# Patient Record
Sex: FEMALE | Race: BLACK OR AFRICAN AMERICAN | Employment: OTHER | URBAN - METROPOLITAN AREA
[De-identification: names, ages, dates, MRNs, and addresses within clinical notes are randomized per-mention and may not be internally consistent; named-entity substitution may affect disease eponyms.]

---

## 2017-08-08 ENCOUNTER — HOSPITAL ENCOUNTER (OUTPATIENT)
Dept: MAMMOGRAPHY | Age: 67
Discharge: HOME | End: 2017-08-08
Attending: FAMILY MEDICINE
Payer: MEDICARE

## 2017-08-08 DIAGNOSIS — Z12.31 VISIT FOR SCREENING MAMMOGRAM: ICD-10-CM

## 2017-08-08 PROCEDURE — 77063 BREAST TOMOSYNTHESIS BI: CPT

## 2018-05-18 ENCOUNTER — HOSPITAL ENCOUNTER (OUTPATIENT)
Dept: PHYSICAL THERAPY | Age: 68
Discharge: HOME | End: 2018-05-18
Payer: MEDICARE

## 2018-05-18 PROCEDURE — G8979 MOBILITY GOAL STATUS: HCPCS

## 2018-05-18 PROCEDURE — 97162 PT EVAL MOD COMPLEX 30 MIN: CPT

## 2018-05-18 PROCEDURE — 97110 THERAPEUTIC EXERCISES: CPT

## 2018-05-18 PROCEDURE — 97112 NEUROMUSCULAR REEDUCATION: CPT

## 2018-05-18 PROCEDURE — G8978 MOBILITY CURRENT STATUS: HCPCS

## 2018-05-18 NOTE — PROGRESS NOTES
[facility]  [address]  [place], [address]  [phone] fax    Plan of Care/ Statement of Necessity for Physical Therapy Services    Patient name: Rusty Small Start of Care: 2018   Referral source: Juarez Epperson, * : 1950    Medical Diagnosis: Unsteadiness on feet [R26.81]   Onset Date:3-4 weeks ago    Treatment Diagnosis: Gait abnormality   Prior Hospitalization: see medical history Provider#: [id number]   Medications: Verified on Patient summary List    Comorbidities: HTN, arthritis, Thyroid problems, breast reduction, BMI > 30   Prior Level of Function: The patient states that she was able to ambulate without SPC and seemed more steady. The Plan of Care and following information is based on the information from the initial evaluation. Assessment/ key information: The patient is a 75year old female with a chief complaint of unsteadiness that started about 3-4 weeks ago. She reports she was walking in the mall and after finishing a few laps, felt unsteady like she was veering to the side. She denies headaches at that time, sensations of the room spinning, or dizziness. She also denies paresthesias and weakness at any time. The patient states that the patient had difficulty performing tandem gait. She is scheduled to have a CT scan on 2018 of her brain. She presents with impairments consisting of decreased balance, limited stability, decreased ADL efficiency, and limited quality of life. The patient will benefit from skilled PT in order to address the aforementioned impairments.     Evaluation Complexity History MEDIUM  Complexity : 1-2 comorbidities / personal factors will impact the outcome/ POC ; Examination MEDIUM Complexity : 3 Standardized tests and measures addressing body structure, function, activity limitation and / or participation in recreation  ;Presentation MEDIUM Complexity : Evolving with changing characteristics  ; Clinical Decision Making MEDIUM Complexity : FOTO score of 26-74  Overall Complexity Rating: MEDIUM  Problem List: pain affecting function, decrease ROM, decrease strength, impaired gait/ balance, decrease ADL/ functional abilitiies and decrease activity tolerance   Treatment Plan may include any combination of the following: Therapeutic exercise, Therapeutic activities, Neuromuscular re-education, Physical agent/modality, Gait/balance training, Manual therapy and Patient education  Patient / Family readiness to learn indicated by: asking questions, trying to perform skills and interest  Persons(s) to be included in education: patient (P)  Barriers to Learning/Limitations: None  Patient Goal (s): well get my balance back to normal  Patient Self Reported Health Status: fair  Rehabilitation Potential: good    Short Term Goals: To be accomplished in 2 weeks:   1. The patient will be independent and compliant with HEP to maximize therapeutic benefit. 2. The patient will improve modified tandem on airex to 30\" without LOB to improve ambulation ease. Long Term Goals: To be accomplished in 4 weeks:   1. The patient will improve FOTO score to 66 to maximize quality of life. 2. The patient will improve DGI score to 16/24 to reduce falls risk. 3. The patient will perform tandem stance 5 back to back steps void of imbalance to reduce falls risk. 4. The patient will resume PLOF with mall walking void of AD to maximize ambulation efficiency and engage in exercise. Frequency / Duration: Patient to be seen 2 times per week for 4 weeks. Patient/ Caregiver education and instruction: Diagnosis, prognosis, self care, activity modification and exercises   [x]  Plan of care has been reviewed with PTA    G-Codes (GP)  Mobility   Current  CK= 40-59%   Goal  CJ= 20-39%    The severity rating is based on clinical judgment and the FOTO score.     Certification Period: 5/18/2018 - 7/18/2018  Gaganpito Ponds, PT 5/18/2018 3:52 PM    ________________________________________________________________________    I certify that the above Therapy Services are being furnished while the patient is under my care. I agree with the treatment plan and certify that this therapy is necessary.     [de-identified] Signature:____________________  Date:____________Time: _________    Please sign and return to [facility] [address] [address] [facility] [address] [place], [place] [address], [address]  [phone] fax

## 2018-05-18 NOTE — PROGRESS NOTES
PT DAILY TREATMENT NOTE - [facility]     Patient Name: Ofelia Marking  Date:2018  : 1950  [x]  Patient  Verified  Payor: Ivan Heard / Plan: [place] MED[place] / Product Type: Medicare /    In time:2:09  Out time:2:55  Total Treatment Time (min): 46  Total Timed Codes (min): 25  1:1 Treatment Time ([address] only): 55   Visit #: 1 of 8    Treatment Area: Unsteadiness on feet [R26.81]    SUBJECTIVE  Pain Level (0-10 scale): 0/10  Any medication changes, allergies to medications, adverse drug reactions, diagnosis change, or new procedure performed?: [x] No    [] Yes (see summary sheet for update)  Subjective functional status/changes:   [] No changes reported  The patient states that she has a chief complaint of unsteadiness with gait. OBJECTIVE  36 min [x]Eval                  []Re-Eval       10 min Therapeutic Exercise:  [x] See flow sheet :   Rationale: increase ROM and increase strength to improve the patients ability to improve ADL ease. 15 min Neuromuscular Re-education:  [x]  See flow sheet :   Rationale: improve coordination, improve balance and increase proprioception  to improve the patients ability to improve ADL ease. With   [] TE   [] TA   [] neuro   [] other: Patient Education: [x] Review HEP    [] Progressed/Changed HEP based on:   [] positioning   [] body mechanics   [] transfers   [] heat/ice application    [] other:      Other Objective/Functional Measures: See IE     Pain Level (0-10 scale) post treatment: 0/10    ASSESSMENT/Changes in Function: See POC. Patient will continue to benefit from skilled PT services to modify and progress therapeutic interventions, address functional mobility deficits, analyze and cue movement patterns, analyze and modify body mechanics/ergonomics, assess and modify postural abnormalities, address imbalance/dizziness and instruct in home and community integration to attain remaining goals.      [x]  See Plan of Care  []  See progress note/recertification  []  See Discharge Summary         Progress towards goals / Updated goals:  Short Term Goals: To be accomplished in 2 weeks:                        1. The patient will be independent and compliant with HEP to maximize therapeutic benefit. 2. The patient will improve modified tandem on airex to 30\" without LOB to improve ambulation ease. Long Term Goals: To be accomplished in 4 weeks:                        1. The patient will improve FOTO score to 66 to maximize quality of life. 2. The patient will improve DGI score to 16/24 to reduce falls risk. 3. The patient will perform tandem stance 5 back to back steps void of imbalance to reduce falls risk.                         4. The patient will resume PLOF with mall walking void of AD to maximize ambulation efficiency and engage in exercise.       PLAN  []  Upgrade activities as tolerated     [x]  Continue plan of care  []  Update interventions per flow sheet       []  Discharge due to:_  []  Other:_      Kristine Sun PT 5/18/2018  4:08 PM    Future Appointments  Date Time Provider Mary Meraz   5/21/2018 11:00 AM HBV CT RM 1 [facility]   5/23/2018 5:00 PM [id number] [facility]   5/25/2018 10:00 AM Judith Reyes PTA [facility]   5/30/2018 10:30 AM Judith Reyes PTA [facility]   6/1/2018 2:00 PM Kristine Sun PT [facility]   6/6/2018 10:00 AM Judith Reyes PTA [facility]   6/8/2018 10:00 AM Judith Reyes PTA [facility]   6/12/2018 10:00 AM Kristine Sun PT [facility]

## 2018-05-21 ENCOUNTER — HOSPITAL ENCOUNTER (OUTPATIENT)
Dept: CT IMAGING | Age: 68
Discharge: HOME | End: 2018-05-21
Attending: FAMILY MEDICINE
Payer: MEDICARE

## 2018-05-21 DIAGNOSIS — R26.81 GENERALLY UNSTEADY: ICD-10-CM

## 2018-05-21 LAB — CREAT UR-MCNC: 0.6 MG/DL (ref 0.6–1.3)

## 2018-05-21 PROCEDURE — 82565 ASSAY OF CREATININE: CPT

## 2018-05-21 PROCEDURE — 74011636320 HC RX REV CODE- 636/320

## 2018-05-21 PROCEDURE — 70470 CT HEAD/BRAIN W/O & W/DYE: CPT

## 2018-05-21 RX ADMIN — IOPAMIDOL 80 ML: 612 INJECTION, SOLUTION INTRAVENOUS at 11:00

## 2018-05-22 ENCOUNTER — HOSPITAL ENCOUNTER (OUTPATIENT)
Dept: PHYSICAL THERAPY | Age: 68
Discharge: HOME | End: 2018-05-22
Payer: MEDICARE

## 2018-05-22 PROCEDURE — 97112 NEUROMUSCULAR REEDUCATION: CPT

## 2018-05-22 NOTE — PROGRESS NOTES
PT DAILY TREATMENT NOTE - [facility]     Patient Name: Yaakov Galeazzi  Date:2018  : 1950   [x]  Patient  Verified  Payor: [place] MEDICARE / Plan: [place] MEDICARE PART A & B / Product Type: Medicare /    In time:12:00  Out time:12:28  Total Treatment Time (min): 28  Total Timed Codes (min): 28  1:1 Treatment Time ( only): 21   Visit #: 2 of 8    Treatment Area: Unsteadiness on feet [R26.81]    SUBJECTIVE  Pain Level (0-10 scale): 2-3/10  Any medication changes, allergies to medications, adverse drug reactions, diagnosis change, or new procedure performed?: [x] No    [] Yes (see summary sheet for update)  Subjective functional status/changes:   [] No changes reported  \"Not really any pain, I'm here for my balance. \"    OBJECTIVE    8 min Therapeutic Exercise:  [x] See flow sheet :   Rationale: increase strength and increase proprioception to improve the patients ability to perform ADL's. 20 min Neuromuscular Re-education:  [x]  See flow sheet :   Rationale: improve coordination, improve balance and increase proprioception  to improve the patients ability to perform functional activities. With   [x] TE   [] TA   [] neuro   [] other: Patient Education: [x] Review HEP    [] Progressed/Changed HEP based on:   [] positioning   [] body mechanics   [] transfers   [] heat/ice application    [] other:      Other Objective/Functional Measures: Initiated exercises per flow sheet. Pain Level (0-10 scale) post treatment: 0/10    ASSESSMENT/Changes in Function: First F/U visit. Unsteadiness with Modified tandem gait, gait with head turns. No difficulty with static balance and sit to stands on airex. Pt unable to give feedback as to why she feels unsteady. Patient will continue to benefit from skilled PT services to address functional mobility deficits, address strength deficits, analyze and modify body mechanics/ergonomics and address imbalance/dizziness to attain remaining goals.      [x]  See Plan of Care  []  See progress note/recertification  []  See Discharge Summary         Progress towards goals / Updated goals:  Short Term Goals: To be accomplished in 2 weeks:                        1. The patient will be independent and compliant with HEP to maximize therapeutic benefit. - Pt reports HEP compliance. 5/22/2018                        2. The patient will improve modified tandem on airex to 30\" without LOB to improve ambulation ease. Long Term Goals: To be accomplished in 4 weeks:                        1. The patient will improve FOTO score to 66 to maximize quality of life. 2. The patient will improve DGI score to 16/24 to reduce falls risk. 3. The patient will perform tandem stance 5 back to back steps void of imbalance to reduce falls risk. 4. The patient will resume PLOF with mall walking void of AD to maximize ambulation efficiency and engage in exercise.     Frequency / Duration: Patient to be seen 2 times per week for 4 weeks.     PLAN  []  Upgrade activities as tolerated     [x]  Continue plan of care  []  Update interventions per flow sheet       []  Discharge due to:_  []  Other:_      Levi Brito PTA 5/22/2018  12:09 PM    Future Appointments  Date Time Provider Mary Meraz   5/25/2018 10:00 AM Carolynn Marquise, PTA [facility]   5/30/2018 10:30 AM Carolynn Marquise, PTA [facility]   6/1/2018 2:00 PM Johanna Blair, JEAN-PIERRE [facility]   6/6/2018 10:00 AM Carolynn Marquise, PTA [facility]   6/8/2018 10:00 AM Carolynn Marquise, PTA [facility]   6/12/2018 10:00 AM Johanna Blair, JEAN-PIERRE [facility]

## 2018-05-23 ENCOUNTER — APPOINTMENT (OUTPATIENT)
Dept: PHYSICAL THERAPY | Age: 68
End: 2018-05-23
Payer: MEDICARE

## 2018-05-25 ENCOUNTER — HOSPITAL ENCOUNTER (OUTPATIENT)
Dept: PHYSICAL THERAPY | Age: 68
Discharge: HOME | End: 2018-05-25
Payer: MEDICARE

## 2018-05-25 PROCEDURE — 97116 GAIT TRAINING THERAPY: CPT

## 2018-05-25 PROCEDURE — 97112 NEUROMUSCULAR REEDUCATION: CPT

## 2018-05-25 NOTE — PROGRESS NOTES
PT DAILY TREATMENT NOTE - [facility]     Patient Name: Richa Oglesby  Date:2018  : 1950  [x]  Patient  Verified  Payor: VA MEDICARE / Plan: [place] MEDICARE PART A & B / Product Type: Medicare /    In time:10:00  Out time:10:24  Total Treatment Time (min): 24  Total Timed Codes (min): 24  1:1 Treatment Time ( [address] only): 24   Visit #: 3 of 8    Treatment Area: Unsteadiness on feet [R26.81]    SUBJECTIVE  Pain Level (0-10 scale): 2/10  Any medication changes, allergies to medications, adverse drug reactions, diagnosis change, or new procedure performed?: [x] No    [] Yes (see summary sheet for update)  Subjective functional status/changes:   [] No changes reported  Pt reports minimal c/o pain. Pt states she is compliant with HEP. OBJECTIVE     14 min Neuromuscular Re-education:  []  See flow sheet :   Rationale: increase ROM and increase strength  to improve the patients ability to tolerate ADLs. 8 min Gait Training: dynamic gait activities as per flow sheet. Rationale: With   [] TE   [] TA   [] neuro   [] other: Patient Education: [x] Review HEP    [] Progressed/Changed HEP based on:   [] positioning   [] body mechanics   [] transfers   [] heat/ice application    [] other:      Other Objective/Functional Measures: multiple LOB with ambulating with head turns. Pain Level (0-10 scale) post treatment: 0/10    ASSESSMENT/Changes in Function: Pt demonstrates mild gait disturbances and decreased proprioception with ambulating with head turns but had no other major deviations with balance activities and gait training.      Patient will continue to benefit from skilled PT services to modify and progress therapeutic interventions, address functional mobility deficits, address strength deficits, analyze and address soft tissue restrictions, analyze and cue movement patterns, analyze and modify body mechanics/ergonomics, assess and modify postural abnormalities and address imbalance/dizziness to attain remaining goals. []  See Plan of Care  []  See progress note/recertification  []  See Discharge Summary         Progress towards goals / Updated goals:  Short Term Goals: To be accomplished in 2 weeks:                        0. The patient will be independent and compliant with HEP to maximize therapeutic benefit. - Pt reports HEP compliance. 5/22/2018                        2. The patient will improve modified tandem on airex to 30\" without LOB to improve ambulation ease. Long Term Goals: To be accomplished in 4 weeks:                        4. The patient will improve FOTO score to 66 to maximize quality of life.                        2. The patient will improve DGI score to 16/24 to reduce falls risk.                        3. The patient will perform tandem stance 5 back to back steps void of imbalance to reduce falls risk.                        4. The patient will resume PLOF with mall walking void of AD to maximize ambulation efficiency and engage in exercise.     PLAN  []  Upgrade activities as tolerated     [x]  Continue plan of care  []  Update interventions per flow sheet       []  Discharge due to:_  []  Other:_      Dolly Richter PTA 5/25/2018  10:36 AM    Future Appointments  Date Time Provider Mary Meraz   5/30/2018 10:30 AM Dolly Richter PTA [facility]   6/1/2018 2:00 PM Joshua Coker PT [facility]   6/6/2018 10:00 AM Dolly Richter PTA [facility]   6/8/2018 10:00 AM Dolly Richter PTA [facility]   6/12/2018 10:00 AM Joshua Coker PT [facility]

## 2018-05-30 ENCOUNTER — APPOINTMENT (OUTPATIENT)
Dept: PHYSICAL THERAPY | Age: 68
End: 2018-05-30
Payer: MEDICARE

## 2018-05-31 ENCOUNTER — HOSPITAL ENCOUNTER (OUTPATIENT)
Dept: PHYSICAL THERAPY | Age: 68
Discharge: HOME | End: 2018-05-31
Payer: MEDICARE

## 2018-05-31 PROCEDURE — 97116 GAIT TRAINING THERAPY: CPT

## 2018-05-31 PROCEDURE — 97112 NEUROMUSCULAR REEDUCATION: CPT

## 2018-05-31 NOTE — PROGRESS NOTES
PT DAILY TREATMENT NOTE - [facility]     Patient Name: Micheal Gonzalez  Date:2018  : 1950  [x]  Patient  Verified  Payor: Lion Jones / Plan: [place] MEDICARE PART A & B / Product Type: Medicare /    In time:9:30  Out time:9:53  Total Treatment Time (min): 23  Total Timed Codes (min): 23  1:1 Treatment Time ( only): 23   Visit #: 4 of 8    Treatment Area: Unsteadiness on feet [R26.81]    SUBJECTIVE  Pain Level (0-10 scale): 1/10  Any medication changes, allergies to medications, adverse drug reactions, diagnosis change, or new procedure performed?: [x] No    [] Yes (see summary sheet for update)  Subjective functional status/changes:   [] No changes reported  Pt reports no new complaints of pain. Pt reports compliance with HEP. \"I just want my balance to get better. \"    OBJECTIVE     15 min Neuromuscular Re-education:  [x]  See flow sheet :   Rationale: increase ROM and increase strength  to improve the patients ability to tolerate ADLs. 8 min Gait Training:  Dynamic gait activities as per flow sheet 60 feet each with no device on level surfaces with SBA/CGA level of assist   Rationale: With   [] TE   [] TA   [] neuro   [] other: Patient Education: [x] Review HEP    [] Progressed/Changed HEP based on:   [] positioning   [] body mechanics   [] transfers   [] heat/ice application    [] other:      Other Objective/Functional Measures: multiple LOB with ambulating with head turns. Pain Level (0-10 scale) post treatment: 0/10    ASSESSMENT/Changes in Function: Pt demonstrates continued gait disturbances with gait training. Patient will continue to benefit from skilled PT services to modify and progress therapeutic interventions, address functional mobility deficits, address ROM deficits, address strength deficits, analyze and address soft tissue restrictions, analyze and cue movement patterns and analyze and modify body mechanics/ergonomics to attain remaining goals.      []  See Plan of Care  []  See progress note/recertification  []  See Discharge Summary         Progress towards goals / Updated goals:  Short Term Goals: To be accomplished in 2 weeks:                        8. The patient will be independent and compliant with HEP to maximize therapeutic benefit. - Pt reports HEP compliance. 5/22/2018                        2. The patient will improve modified tandem on airex to 30\" without LOB to improve ambulation ease. Long Term Goals: To be accomplished in 4 weeks:                        3. The patient will improve FOTO score to 66 to maximize quality of life.                        2. The patient will improve DGI score to 16/24 to reduce falls risk.                        3. The patient will perform tandem stance 5 back to back steps void of imbalance to reduce falls risk.                        4. The patient will resume PLOF with mall walking void of AD to maximize ambulation efficiency and engage in exercise.     PLAN  []  Upgrade activities as tolerated     [x]  Continue plan of care  []  Update interventions per flow sheet       []  Discharge due to:_  []  Other:_      Alisha Alvarez PTA 5/31/2018  9:49 AM    Future Appointments  Date Time Provider Mary Meraz   [facility]2018 [facility]   6/12/2018 10:00 AM Radha Weber PT [facility]   6/15/2018 2:00 PM [facility]

## 2018-06-01 ENCOUNTER — HOSPITAL ENCOUNTER (OUTPATIENT)
Dept: PHYSICAL THERAPY | Age: 68
Discharge: HOME | End: 2018-06-01
Payer: MEDICARE

## 2018-06-01 PROCEDURE — 97112 NEUROMUSCULAR REEDUCATION: CPT

## 2018-06-01 PROCEDURE — 97110 THERAPEUTIC EXERCISES: CPT

## 2018-06-01 NOTE — PROGRESS NOTES
PT DAILY TREATMENT NOTE - [facility]     Patient Name: Cody Woodard  Date:2018  : 1950  [x]  Patient  Verified  Payor: Mati Pelaez / Plan: [place] MEDICARE PART A & B / Product Type: Medicare /    In time:2:04  Out time:2:45  Total Treatment Time (min): 41  Total Timed Codes (min): 41  1:1 Treatment Time ([address] only): 41   Visit #: 5 of 8    Treatment Area: Unsteadiness on feet [R26.81]    SUBJECTIVE  Pain Level (0-10 scale): 10  Any medication changes, allergies to medications, adverse drug reactions, diagnosis change, or new procedure performed?: [x] No    [] Yes (see summary sheet for update)  Subjective functional status/changes:   [] No changes reported  The patient states that she feels her balance has improved. OBJECTIVE   10 min Therapeutic Exercise:  [x] See flow sheet :   Rationale: increase ROM and increase strength to improve the patients ability to improve ADL ease. 31 min Neuromuscular Re-education:  [x]  See flow sheet :   Rationale: increase ROM and increase strength  to improve the patients ability to improve ADL ease. With   [] TE   [] TA   [] neuro   [] other: Patient Education: [x] Review HEP    [] Progressed/Changed HEP based on:   [] positioning   [] body mechanics   [] transfers   [] heat/ice application    [] other:      Other Objective/Functional Measures: FOTO: 68  Improvement noted with tandem walking  Tandem stance 30\"     Pain Level (0-10 scale) post treatment: 0/10    ASSESSMENT/Changes in Function: Progressing well with regards to balance and stability. Improved FOTO score indicating improved quality of life.     Patient will continue to benefit from skilled PT services to modify and progress therapeutic interventions, address functional mobility deficits, analyze and cue movement patterns, analyze and modify body mechanics/ergonomics, assess and modify postural abnormalities, address imbalance/dizziness and instruct in home and community integration to attain remaining goals. []  See Plan of Care  []  See progress note/recertification  []  See Discharge Summary         Progress towards goals / Updated goals:  Short Term Goals: To be accomplished in 2 weeks:                        4. The patient will be independent and compliant with HEP to maximize therapeutic benefit. - Pt reports HEP compliance. 5/22/2018                        2. The patient will improve modified tandem on airex to 30\" without LOB to improve ambulation ease. Progressing nearly met 6/01/2018  Long Term Goals: To be accomplished in 4 weeks:                        2. The patient will improve FOTO score to 66 to maximize quality of life. Met - 68 6/01/2018                        2. The patient will improve DGI score to 16/24 to reduce falls risk.                        3. The patient will perform tandem stance 5 back to back steps void of imbalance to reduce falls risk.                        4. The patient will resume PLOF with mall walking void of AD to maximize ambulation efficiency and engage in exercise.     PLAN  []  Upgrade activities as tolerated     [x]  Continue plan of care  []  Update interventions per flow sheet       []  Discharge due to:_  []  Other:_      Jocelyn Euceda, PT 6/1/2018  2:10 PM    Future Appointments  Date Time Provider Mary Meraz   6/12/2018 10:00 AM Jocelyn Euceda PT [facility]   6/15/2018 2:00 PM [facility]

## 2018-06-06 ENCOUNTER — APPOINTMENT (OUTPATIENT)
Dept: PHYSICAL THERAPY | Age: 68
End: 2018-06-06
Payer: MEDICARE

## 2018-06-08 ENCOUNTER — APPOINTMENT (OUTPATIENT)
Dept: PHYSICAL THERAPY | Age: 68
End: 2018-06-08
Payer: MEDICARE

## 2018-06-15 ENCOUNTER — HOSPITAL ENCOUNTER (OUTPATIENT)
Dept: PHYSICAL THERAPY | Age: 68
Discharge: HOME | End: 2018-06-15
Payer: MEDICARE

## 2018-06-15 PROCEDURE — 97110 THERAPEUTIC EXERCISES: CPT

## 2018-06-15 PROCEDURE — 97112 NEUROMUSCULAR REEDUCATION: CPT

## 2018-06-15 NOTE — PROGRESS NOTES
PT DAILY TREATMENT NOTE - [facility]     Patient Name: Jose Ramon Moctezuma  Date:6/15/2018  : 1950  [x]  Patient  Verified  Payor: [place] MEDICARE / Plan: [place] MEDICARE PART A & B / Product Type: Medicare /    In time:2:03  Out time:2:35  Total Treatment Time (min): 32  Total Timed Codes (min): 32  1:1 Treatment Time ( only): 32   Visit #: 6 of 8    Treatment Area: Unsteadiness on feet [R26.81]    SUBJECTIVE  Pain Level (0-10 scale): 0/10  Any medication changes, allergies to medications, adverse drug reactions, diagnosis change, or new procedure performed?: [x] No    [] Yes (see summary sheet for update)  Subjective functional status/changes:   [] No changes reported  The patient denies pain upon arrival. States that she has had slow but steady improvement with her balance, but still does note some unsteadiness when walking. OBJECTIVE  10 min Therapeutic Exercise:  [x] See flow sheet :   Rationale: increase ROM and increase strength to improve the patients ability to improve ADL ease. 22 min Neuromuscular Re-education:  [x]  See flow sheet :   Rationale: improve coordination, improve balance and increase proprioception  to improve the patients ability to improve ADL ease. With   [] TE   [] TA   [] neuro   [] other: Patient Education: [x] Review HEP    [] Progressed/Changed HEP based on:   [] positioning   [] body mechanics   [] transfers   [] heat/ice application    [] other:      Other Objective/Functional Measures:   Performed 600' ambulation at quickened pace and reports ambulating 1.5 miles in mall with greater degree of velocity than what was walked prior. 7 back to back tandem steps noted    Pain Level (0-10 scale) post treatment: 0/10    ASSESSMENT/Changes in Function: Fairly good stability noted, especially when ambulating in clinic with quickened pace. Recommend 1 additional visit with probable D/C following.     Patient will continue to benefit from skilled PT services to modify and progress therapeutic interventions, address functional mobility deficits, address ROM deficits, address strength deficits, analyze and address soft tissue restrictions, analyze and cue movement patterns, analyze and modify body mechanics/ergonomics, assess and modify postural abnormalities, address imbalance/dizziness and instruct in home and community integration to attain remaining goals. []  See Plan of Care  []  See progress note/recertification  []  See Discharge Summary         Progress towards goals / Updated goals:  Short Term Goals: To be accomplished in 2 weeks:                        6. The patient will be independent and compliant with HEP to maximize therapeutic benefit. - Pt reports HEP compliance. 5/22/2018                        2. The patient will improve modified tandem on airex to 30\" without LOB to improve ambulation ease. Progressing nearly met 6/01/2018  Long Term Goals: To be accomplished in 4 weeks:                        1. The patient will improve FOTO score to 66 to maximize quality of life. Met - 68 6/01/2018                        2. The patient will improve DGI score to 16/24 to reduce falls risk.                        3. The patient will perform tandem stance 5 back to back steps void of imbalance to reduce falls risk. Met 7 tandem steps 6/15/2018                        4. The patient will resume PLOF with mall walking void of AD to maximize ambulation efficiency and engage in exercise.     PLAN  []  Upgrade activities as tolerated     [x]  Continue plan of care  []  Update interventions per flow sheet       []  Discharge due to:_  []  Other:_      Johanna Blair PT 6/15/2018  2:08 PM    Future Appointments  Date Time Provider Mary Meraz   6/19/2018 2:00 PM Johanna Blair PT [id number]

## 2018-06-19 ENCOUNTER — HOSPITAL ENCOUNTER (OUTPATIENT)
Dept: PHYSICAL THERAPY | Age: 68
Discharge: HOME | End: 2018-06-19
Payer: MEDICARE

## 2018-06-19 PROCEDURE — 97112 NEUROMUSCULAR REEDUCATION: CPT

## 2018-06-19 PROCEDURE — 97110 THERAPEUTIC EXERCISES: CPT

## 2018-06-19 NOTE — PROGRESS NOTES
PT DAILY TREATMENT NOTE - [facility]     Patient Name: Reuben Kunal  Date:2018  : 1950  [x]  Patient  Verified  Payor: Jero Ozuna / Plan: [place] MEDICARE PART A & B / Product Type: Medicare /    In time:2:00  Out time:2:30  Total Treatment Time (min): 30  Total Timed Codes (min): 30  1:1 Treatment Time ( [address] only): 30   Visit #: 7 of 8    Treatment Area: Unsteadiness on feet [R26.81]    SUBJECTIVE  Pain Level (0-10 scale): 1/10  Any medication changes, allergies to medications, adverse drug reactions, diagnosis change, or new procedure performed?: [x] No    [] Yes (see summary sheet for update)  Subjective functional status/changes:   [] No changes reported  The patient states that she feels her balance is a little better, but continues to feel somewhat unsteady. OBJECTIVE  8 min Therapeutic Exercise:  [] See flow sheet :   Rationale: increase ROM and increase strength to improve the patients ability to improve ADL ease. 22 min Neuromuscular Re-education:  []  See flow sheet :   Rationale: improve coordination, improve balance and increase proprioception  to improve the patients ability to improve ADL ease . With   [] TE   [] TA   [] neuro   [] other: Patient Education: [x] Review HEP    [] Progressed/Changed HEP based on:   [] positioning   [] body mechanics   [] transfers   [] heat/ice application    [] other:      Other Objective/Functional Measures:   DGI: 20/24  Able to perform tandem stance nearly 5 steps consecutively. The patient has returned to about 1.5 miles of mall walking, nearly back to PLOF. Pain Level (0-10 scale) post treatment: 0/10    ASSESSMENT/Changes in Function: The patient has made significant progress in PT, though she did have notably poor stability especially through left LE upon dynamic activity especially. The patient will benefit from an additional 2 weeks of PT in order to continue to reduce falls risk.     Patient will continue to benefit from skilled PT services to modify and progress therapeutic interventions, address functional mobility deficits, address ROM deficits, address strength deficits, analyze and address soft tissue restrictions, analyze and cue movement patterns, analyze and modify body mechanics/ergonomics, assess and modify postural abnormalities and instruct in home and community integration to attain remaining goals. []  See Plan of Care  []  See progress note/recertification  []  See Discharge Summary         Progress towards goals / Updated goals:  Short Term Goals: To be accomplished in 2 weeks:                        4. The patient will be independent and compliant with HEP to maximize therapeutic benefit. - Pt reports HEP compliance. 5/22/2018                        2. The patient will improve modified tandem on airex to 30\" without LOB to improve ambulation ease. Progressing nearly met 6/01/2018  Long Term Goals: To be accomplished in 4 weeks:                        3. The patient will improve FOTO score to 66 to maximize quality of life. Met - 68 6/01/2018                        2. The patient will improve DGI score to 16/24 to reduce falls risk. Met  20/24/2018                        3. The patient will perform tandem stance 5 back to back steps void of imbalance to reduce falls risk. Met 7 tandem steps 6/15/2018                        4. The patient will resume PLOF with mall walking void of AD to maximize ambulation efficiency and engage in exercise. Nearly met - performs 1.5 miles in mall 6/19/2018       PLAN  []  Upgrade activities as tolerated     [x]  Continue plan of care  []  Update interventions per flow sheet       []  Discharge due to:_  []  Other:_      Jhony Smith PT 6/19/2018  2:31 PM    No future appointments.

## 2018-06-22 ENCOUNTER — HOSPITAL ENCOUNTER (OUTPATIENT)
Dept: PHYSICAL THERAPY | Age: 68
Discharge: HOME | End: 2018-06-22
Payer: MEDICARE

## 2018-06-22 PROCEDURE — 97110 THERAPEUTIC EXERCISES: CPT

## 2018-06-22 PROCEDURE — 97112 NEUROMUSCULAR REEDUCATION: CPT

## 2018-06-22 NOTE — PROGRESS NOTES
PT DAILY TREATMENT NOTE - [facility]     Patient Name: Keshia Angulo  Date:2018  : 1950  [x]  Patient  Verified  Payor: [place] MEDICARE / Plan: [place] MEDICARE PART A & B / Product Type: Medicare /    In time:2:00  Out time:2:30  Total Treatment Time (min): 30  Total Timed Codes (min): 30  1:1 Treatment Time ( only): 30   Visit #: 1 of 4    Treatment Area: Unsteadiness on feet [R26.81]    SUBJECTIVE  Pain Level (0-10 scale): 0/10  Any medication changes, allergies to medications, adverse drug reactions, diagnosis change, or new procedure performed?: [x] No    [] Yes (see summary sheet for update)  Subjective functional status/changes:   [x] No changes reported    OBJECTIVE    8 min Therapeutic Exercise:  [x] See flow sheet :   Rationale: increase strength and increase proprioception to improve the patients ability to perform ADL's.      22 min Neuromuscular Re-education:  [x]  See flow sheet :   Rationale: improve coordination, improve balance and increase proprioception  to improve the patients ability to negotiate community distances safely. With   [x] TE   [] TA   [] neuro   [] other: Patient Education: [x] Review HEP    [] Progressed/Changed HEP based on:   [] positioning   [] body mechanics   [] transfers   [] heat/ice application    [] other:      Other Objective/Functional Measures: Performed MT balance on shuttle balance. Pain Level (0-10 scale) post treatment: 0/10    ASSESSMENT/Changes in Function: Fair tolerance with horizontal shifts, occasionally requires step correction to regain balance. Patient will continue to benefit from skilled PT services to modify and progress therapeutic interventions, address functional mobility deficits, address strength deficits, analyze and cue movement patterns, analyze and modify body mechanics/ergonomics and address imbalance/dizziness to attain remaining goals.      [x]  See Plan of Care  []  See progress note/recertification  []  See Discharge Summary         Progress towards goals / Updated goals:  Short Term Goals: To be accomplished in 2 weeks:                        9. The patient will be independent and compliant with HEP to maximize therapeutic benefit. - Pt reports HEP compliance. 5/22/2018                        2. The patient will improve modified tandem on airex to 30\" without LOB to improve ambulation ease. Progressing nearly met 6/01/2018  Long Term Goals: To be accomplished in 4 weeks:                        3. The patient will improve FOTO score to 66 to maximize quality of life. Met - 68 6/01/2018                        2. The patient will improve DGI score to 16/24 to reduce falls risk. Met  20/24/2018                        3. The patient will perform tandem stance 5 back to back steps void of imbalance to reduce falls risk. Met 7 tandem steps 6/15/2018                        4. The patient will resume PLOF with mall walking void of AD to maximize ambulation efficiency and engage in exercise.  Nearly met - performs 1.5 miles in mall 6/19/2018    PLAN  []  Upgrade activities as tolerated     [x]  Continue plan of care  []  Update interventions per flow sheet       []  Discharge due to:_  []  Other:_      Radha Perez PTA 6/22/2018  1:58 PM    Future Appointments  Date Time Provider Mary Meraz   6/22/2018 2:00 PM Radha Perez PTA [facility]   6/26/2018 4:00 PM Radha Perez PTA [facility]   6/29/2018 2:30 PM Radha Perez PTA [facility]   7/3/2018 2:30 PM Zamzam Oneill PT [facility]

## 2018-06-26 ENCOUNTER — HOSPITAL ENCOUNTER (OUTPATIENT)
Dept: PHYSICAL THERAPY | Age: 68
Discharge: HOME | End: 2018-06-26
Payer: MEDICARE

## 2018-06-26 PROCEDURE — 97112 NEUROMUSCULAR REEDUCATION: CPT

## 2018-06-26 NOTE — PROGRESS NOTES
PT DAILY TREATMENT NOTE - [facility]     Patient Name: Roland Reveal  Date:2018  : 1950  [x]  Patient  Verified  Payor: Vanessa Mesa / Plan: [place] MEDICARE PART A & B / Product Type: Medicare /    In time:3:58  Out time:4:26  Total Treatment Time (min): 28  Total Timed Codes (min): 28  1:1 Treatment Time ( [address] only): 19   Visit #: 2 of 4    Treatment Area: Unsteadiness on feet [R26.81]    SUBJECTIVE  Pain Level (0-10 scale): 0/10  Any medication changes, allergies to medications, adverse drug reactions, diagnosis change, or new procedure performed?: [x] No    [] Yes (see summary sheet for update)  Subjective functional status/changes:   [x] No changes reported    OBJECTIVE    8 min Therapeutic Exercise:  [x] See flow sheet :   Rationale: increase strength and increase proprioception to improve the patients ability to perform ADL's. 20 min Neuromuscular Re-education:  [x]  See flow sheet :   Rationale: improve coordination, improve balance and increase proprioception  to improve the patients ability to perform functional activities in the community. With   [x] TE   [] TA   [] neuro   [] other: Patient Education: [x] Review HEP    [] Progressed/Changed HEP based on:   [] positioning   [] body mechanics   [] transfers   [] heat/ice application    [] other:      Other Objective/Functional Measures:      Pain Level (0-10 scale) post treatment: 0/10    ASSESSMENT/Changes in Function: Fair difficulty with dynamic lateral weight shifting. Requires occasional rail support or min(A) to regain balance while performing lateral step ups. Patient will continue to benefit from skilled PT services to modify and progress therapeutic interventions, address strength deficits, analyze and cue movement patterns, analyze and modify body mechanics/ergonomics and address imbalance/dizziness to attain remaining goals.      []  See Plan of Care  []  See progress note/recertification  []  See Discharge Summary Progress towards goals / Updated goals:  Short Term Goals: To be accomplished in 2 weeks:                        2. The patient will be independent and compliant with HEP to maximize therapeutic benefit. - Pt reports HEP compliance. 5/22/2018                        2. The patient will improve modified tandem on airex to 30\" without LOB to improve ambulation ease. Progressing nearly met 6/01/2018  Long Term Goals: To be accomplished in 4 weeks:                        9. The patient will improve FOTO score to 66 to maximize quality of life. Met - 68 6/01/2018                        2. The patient will improve DGI score to 16/24 to reduce falls risk. Met  20/24/2018                        3. The patient will perform tandem stance 5 back to back steps void of imbalance to reduce falls risk. Met 7 tandem steps 6/15/2018                        4. The patient will resume PLOF with mall walking void of AD to maximize ambulation efficiency and engage in exercise.  Nearly met - performs 1.5 miles in mall 6/19/2018    PLAN  []  Upgrade activities as tolerated     [x]  Continue plan of care  []  Update interventions per flow sheet       []  Discharge due to:_  []  Other:_      Gracie Darden PTA 6/26/2018  4:03 PM    Future Appointments  Date Time Provider Mayr Meraz   6/29/2018 2:30 PM Gracie Darden PTA [facility]   7/3/2018 2:30 PM Radha Romo PT [facility]

## 2018-06-29 ENCOUNTER — HOSPITAL ENCOUNTER (OUTPATIENT)
Dept: PHYSICAL THERAPY | Age: 68
Discharge: HOME | End: 2018-06-29
Payer: MEDICARE

## 2018-06-29 PROCEDURE — 97112 NEUROMUSCULAR REEDUCATION: CPT

## 2018-06-29 NOTE — PROGRESS NOTES
PT DAILY TREATMENT NOTE - [facility]     Patient Name: Princess Santana  Date:2018  : 1950  [x]  Patient  Verified  Payor: Neto Carrera / Plan: [place] MEDICARE PART A & B / Product Type: Medicare /    In time:230  Out time:300  Total Treatment Time (min): 30  Total Timed Codes (min): 30  1:1 Treatment Time ( [address] only): 22  Visit #: 3 of 4    Treatment Area: Unsteadiness on feet [R26.81]    SUBJECTIVE  Pain Level (0-10 scale): 0  Any medication changes, allergies to medications, adverse drug reactions, diagnosis change, or new procedure performed?: [x] No    [] Yes (see summary sheet for update)  Subjective functional status/changes:   [x] No changes reported      OBJECTIVE    8 min Therapeutic Exercise:  [x] See flow sheet :   Rationale: increase ROM and increase strength to improve the patients ability to perform ADL's.    22 min Neuromuscular Re-education:  [x]  See flow sheet :   Rationale: increase ROM, increase strength, improve coordination, improve balance and increase proprioception  to improve the patients ability to perform functional tasks. With   [x] TE   [] TA   [] neuro   [] other: Patient Education: [x] Review HEP    [] Progressed/Changed HEP based on:   [] positioning   [] body mechanics   [] transfers   [] heat/ice application    [] other:      Other Objective/Functional Measures: Increase domenico navigation distance and repetions    Pain Level (0-10 scale) post treatment: 0/10    ASSESSMENT/Changes in Function: Pt has difficulty weight shifting for domenico navigation or step ups. Pt's ability to weight shift and control her balance is beginning to improve. Also, she tries to rush through movements that impair her balance.     Patient will continue to benefit from skilled PT services to modify and progress therapeutic interventions, address functional mobility deficits, address ROM deficits, address strength deficits, analyze and address soft tissue restrictions, analyze and cue movement patterns, analyze and modify body mechanics/ergonomics, assess and modify postural abnormalities, address imbalance/dizziness and instruct in home and community integration to attain remaining goals. []  See Plan of Care  []  See progress note/recertification  []  See Discharge Summary         Progress towards goals / Updated goals:  Short Term Goals: To be accomplished in 2 weeks:                        8. The patient will be independent and compliant with HEP to maximize therapeutic benefit. - Pt reports HEP compliance. 5/22/2018                        2. The patient will improve modified tandem on airex to 30\" without LOB to improve ambulation ease. Progressing nearly met 6/01/2018  Long Term Goals: To be accomplished in 4 weeks:                        9. The patient will improve FOTO score to 66 to maximize quality of life. Met - 68 6/01/2018                        2. The patient will improve DGI score to 16/24 to reduce falls risk. Met  20/24/2018                        3. The patient will perform tandem stance 5 back to back steps void of imbalance to reduce falls risk. Met 7 tandem steps 6/15/2018                        4. The patient will resume PLOF with mall walking void of AD to maximize ambulation efficiency and engage in exercise.  Nearly met - performs 1.5 miles in mall 6/19/2018    PLAN  []  Upgrade activities as tolerated     [x]  Continue plan of care  []  Update interventions per flow sheet       []  Discharge due to:_  []  Other:_      Mirian Magdaleno, PT 6/29/2018  2:32 PM  Maribell Vieira, SPT    Future Appointments  Date Time Provider Mary Meraz   7/3/2018 2:30 PM Yosef Hwang, PT [facility]

## 2018-07-03 ENCOUNTER — HOSPITAL ENCOUNTER (OUTPATIENT)
Dept: PHYSICAL THERAPY | Age: 68
Discharge: HOME | End: 2018-07-03
Payer: MEDICARE

## 2018-07-03 PROCEDURE — G8980 MOBILITY D/C STATUS: HCPCS

## 2018-07-03 PROCEDURE — 97112 NEUROMUSCULAR REEDUCATION: CPT

## 2018-07-03 PROCEDURE — G8979 MOBILITY GOAL STATUS: HCPCS

## 2018-07-03 NOTE — PROGRESS NOTES
PT DAILY TREATMENT NOTE - [facility]     Patient Name: Maria Luisa Queen  Date:7/3/2018  : 1950  [x]  Patient  Verified  Payor: Mohamud Caldera / Plan: [place] MEDICARE PART A & B / Product Type: Medicare /    In time:2:30  Out time:3:00  Total Treatment Time (min): 30  Total Timed Codes (min): 30  1:1 Treatment Time ( only): 30   Visit #: 4 of 4    Treatment Area: Unsteadiness on feet [R26.81]    SUBJECTIVE  Pain Level (0-10 scale): 0/10  Any medication changes, allergies to medications, adverse drug reactions, diagnosis change, or new procedure performed?: [x] No    [] Yes (see summary sheet for update)  Subjective functional status/changes:   [] No changes reported  The patient states that her balance has felt better lately. OBJECTIVE  30 min Neuromuscular Re-education:  [x]  See flow sheet :   Rationale: increase ROM and increase strength  to improve the patients ability to improve ADL ease. With   [] TE   [] TA   [] neuro   [] other: Patient Education: [x] Review HEP    [] Progressed/Changed HEP based on:   [] positioning   [] body mechanics   [] transfers   [] heat/ice application    [] other:      Other Objective/Functional Measures:  Attained all goals with regards to balance with notable improvement regarding effective weight shift. Pain Level (0-10 scale) post treatment: 0/10    ASSESSMENT/Changes in Function: The patient states that she has been doing well and resumed full mall walking routine without issue. She denies further problems at this time and does demonstrate effective weight shift during single leg or step ups. [x]  See Discharge Summary         Progress towards goals / Updated goals:  Short Term Goals: To be accomplished in 2 weeks:                        5. The patient will be independent and compliant with HEP to maximize therapeutic benefit. - Pt reports HEP compliance. 2018                        2.  The patient will improve modified tandem on airex to 30\" without LOB to improve ambulation ease. Progressing nearly met 6/01/2018  Long Term Goals: To be accomplished in 4 weeks:                        5. The patient will improve FOTO score to 66 to maximize quality of life. Met - 68 6/01/2018                        2. The patient will improve DGI score to 16/24 to reduce falls risk. Met  20/24/2018                        3. The patient will perform tandem stance 5 back to back steps void of imbalance to reduce falls risk. Met 7 tandem steps 6/15/2018                        4. The patient will resume PLOF with mall walking void of AD to maximize ambulation efficiency and engage in exercise. Nearly met - performs 1.5 miles in mall 6/19/2018    PLAN  [x]  Discharge due to: progressed quite well with regards to attaining goals. Darrion Claire, PT 7/3/2018  3:02 PM    No future appointments.

## 2018-07-03 NOTE — PROGRESS NOTES
[facility]  [address]  [place], [address]  [phone] fax    Physical Therapy Discharge Summary    Patient name: Enrique Stoner Start of Care: 2018   Referral source: Florence Jamison, * : 1950                         Medical Diagnosis: Unsteadiness on feet [R26.81] Onset Date:3-4 weeks ago                         Treatment Diagnosis: Gait abnormality   Prior Hospitalization: see medical history Provider#: [id number]   Medications: Verified on Patient summary List    Comorbidities: HTN, arthritis, Thyroid problems, breast reduction, BMI > 30   Prior Level of Function: The patient states that she was able to ambulate without SPC and seemed more steady. Visits from Start of Care: 11    Missed Visits: 1    Reporting Period: 2018 to 2018    Summary of Care:  Short Term Goals: To be accomplished in 2 weeks:                        8. The patient will be independent and compliant with HEP to maximize therapeutic benefit. - Pt reports HEP compliance. 2018                        2. The patient will improve modified tandem on airex to 30\" without LOB to improve ambulation ease. Progressing nearly met 2018  Long Term Goals: To be accomplished in 4 weeks:                        8. The patient will improve FOTO score to 66 to maximize quality of life. Met - 68 2018                        2. The patient will improve DGI score to 16/24 to reduce falls risk. Met                          3. The patient will perform tandem stance 5 back to back steps void of imbalance to reduce falls risk. Met 7 tandem steps 6/15/2018                        4. The patient will resume PLOF with mall walking void of AD to maximize ambulation efficiency and engage in exercise.  Nearly met - performs 1.5 miles in mall 2018    G-Codes (GP)  Mobility    Goal  CJ= 20-39%  D/C  CJ= 20-39%    The severity rating is based on clinical judgment and the FOTO score. ASSESSMENT/RECOMMENDATIONS: The patient states that she has been doing well and resumed full mall walking routine without issue. She denies further problems at this time and does demonstrate effective weight shift during single leg and step ups.      [x]Discontinue therapy: [x]Patient has reached or is progressing toward set goals      []Patient is non-compliant or has abdicated      []Due to lack of appreciable progress towards set [address], PT 7/3/2018 3:18 PM

## 2018-08-09 ENCOUNTER — HOSPITAL ENCOUNTER (OUTPATIENT)
Dept: MAMMOGRAPHY | Age: 68
Discharge: HOME | End: 2018-08-09
Attending: FAMILY MEDICINE
Payer: MEDICARE

## 2018-08-09 DIAGNOSIS — Z12.39 BREAST CANCER SCREENING: ICD-10-CM

## 2018-08-09 PROCEDURE — 77063 BREAST TOMOSYNTHESIS BI: CPT

## 2019-08-12 ENCOUNTER — HOSPITAL ENCOUNTER (OUTPATIENT)
Dept: MAMMOGRAPHY | Age: 69
Discharge: HOME | End: 2019-08-12
Attending: FAMILY MEDICINE
Payer: MEDICARE

## 2019-08-12 DIAGNOSIS — Z12.31 VISIT FOR SCREENING MAMMOGRAM: ICD-10-CM

## 2019-08-12 PROCEDURE — 77063 BREAST TOMOSYNTHESIS BI: CPT

## 2020-09-18 ENCOUNTER — HOSPITAL ENCOUNTER (OUTPATIENT)
Dept: MAMMOGRAPHY | Age: 70
Discharge: HOME | End: 2020-09-18
Attending: FAMILY MEDICINE
Payer: MEDICARE

## 2020-09-18 DIAGNOSIS — Z12.31 VISIT FOR SCREENING MAMMOGRAM: ICD-10-CM

## 2020-09-18 PROCEDURE — 77063 BREAST TOMOSYNTHESIS BI: CPT

## 2020-10-15 ENCOUNTER — TRANSCRIBE ORDER (OUTPATIENT)
Dept: SCHEDULING | Age: 70
End: 2020-10-15

## 2020-10-15 DIAGNOSIS — M54.16 LUMBAR RADICULOPATHY: ICD-10-CM

## 2020-10-15 DIAGNOSIS — M47.816 LUMBAR SPONDYLOSIS: Primary | ICD-10-CM

## 2020-11-03 ENCOUNTER — HOSPITAL ENCOUNTER (OUTPATIENT)
Age: 70
Discharge: HOME | End: 2020-11-03
Attending: PHYSICIAN ASSISTANT
Payer: MEDICARE

## 2020-11-03 DIAGNOSIS — M54.16 LUMBAR RADICULOPATHY: ICD-10-CM

## 2020-11-03 DIAGNOSIS — M47.816 LUMBAR SPONDYLOSIS: ICD-10-CM

## 2020-11-03 PROCEDURE — 72148 MRI LUMBAR SPINE W/O DYE: CPT

## 2021-08-30 ENCOUNTER — TRANSCRIBE ORDER (OUTPATIENT)
Dept: SCHEDULING | Age: 71
End: 2021-08-30

## 2021-08-30 DIAGNOSIS — Z12.31 VISIT FOR SCREENING MAMMOGRAM: Primary | ICD-10-CM

## 2021-09-20 ENCOUNTER — HOSPITAL ENCOUNTER (OUTPATIENT)
Dept: MAMMOGRAPHY | Age: 71
Discharge: HOME | End: 2021-09-20
Attending: FAMILY MEDICINE
Payer: MEDICARE

## 2021-09-20 DIAGNOSIS — Z12.31 VISIT FOR SCREENING MAMMOGRAM: ICD-10-CM

## 2021-09-20 PROCEDURE — 77063 BREAST TOMOSYNTHESIS BI: CPT

## 2022-08-30 ENCOUNTER — TRANSCRIBE ORDER (OUTPATIENT)
Dept: SCHEDULING | Age: 72
End: 2022-08-30

## 2022-08-30 DIAGNOSIS — Z12.31 SCREENING MAMMOGRAM, ENCOUNTER FOR: Primary | ICD-10-CM

## 2022-09-22 ENCOUNTER — HOSPITAL ENCOUNTER (OUTPATIENT)
Dept: MAMMOGRAPHY | Age: 72
Discharge: HOME | End: 2022-09-22
Attending: FAMILY MEDICINE
Payer: COMMERCIAL

## 2022-09-22 DIAGNOSIS — Z12.31 SCREENING MAMMOGRAM, ENCOUNTER FOR: ICD-10-CM

## 2022-09-22 PROCEDURE — 77063 BREAST TOMOSYNTHESIS BI: CPT

## 2022-10-31 ENCOUNTER — TELEPHONE (OUTPATIENT)
Dept: PHYSICAL THERAPY | Age: 72
End: 2022-10-31

## 2022-11-16 ENCOUNTER — HOSPITAL ENCOUNTER (OUTPATIENT)
Dept: PHYSICAL THERAPY | Age: 72
Discharge: HOME | End: 2022-11-16
Payer: MEDICARE

## 2022-11-16 PROCEDURE — 97162 PT EVAL MOD COMPLEX 30 MIN: CPT

## 2022-11-16 PROCEDURE — 97140 MANUAL THERAPY 1/> REGIONS: CPT

## 2022-11-16 PROCEDURE — 97110 THERAPEUTIC EXERCISES: CPT

## 2022-11-16 NOTE — PROGRESS NOTES
PT DAILY TREATMENT NOTE     Patient Name: Isai Louis  Date:2022  : 1950  [x]  Patient  Verified  Payor: BLUE CROSS MEDICARE / Plan: [place] / Product Type: Managed Care Medicare /    In time:1130  Out time:12:12  Total Treatment Time (min): 42  Visit #: 1 of 8    Medicare/BCBS Only   Total Timed Codes (min):  24 1:1 Treatment Time:  42       Treatment Area:  Adhesive capsulitis of right shoulder [M75.01]  Bursitis of right shoulder [M75.51]    SUBJECTIVE  Pain Level (0-10 scale): 6  Any medication changes, allergies to medications, adverse drug reactions, diagnosis change, or new procedure performed?: [x] No    [] Yes (see summary sheet for update)  Subjective functional status/changes:   [] No changes reported       OBJECTIVE    Modality rationale:    Min Type Additional Details    [] Estim:  []Unatt       []IFC  []Premod                        []Other:  []w/ice   []w/heat  Position:  Location:    [] Estim: []Att    []TENS instruct  []NMES                    []Other:  []w/US   []w/ice   []w/heat  Position:  Location:    []  Traction: [] Cervical       []Lumbar                       [] Prone          []Supine                       []Intermittent   []Continuous Lbs:  [] before manual  [] after manual    []  Ultrasound: []Continuous   [] Pulsed                           []1MHz   []3MHz W/cm2:  Location:    []  Iontophoresis with dexamethasone         Location: [] Take home patch   [] In clinic    []  Ice     []  heat  []  Ice massage  []  Laser   []  Anodyne Position:  Location:    []  Laser with stim  []  Other:  Position:  Location:    []  Vasopneumatic Device    []  Right     []  Left  Pre-treatment girth:  Post-treatment girth:  Measured at (location):  Pressure:       [] lo [] med [] hi   Temperature: [] lo [] med [] hi   [] Skin assessment post-treatment:  []intact []redness- no adverse reaction    []redness - adverse reaction:     18 min [x]Eval []Re-Eval       16 min Therapeutic Exercise:  [] See flow sheet : HEP   Rationale: increase ROM and increase strength to improve the patients ability to perform ADL    8 min Manual Therapy:  Grade II GHJ post, inf glides, PROM of the right shoulder with pt supine   The manual therapy interventions were performed at a separate and distinct time from the therapeutic activities interventions. Rationale: increase ROM and increase tissue extensibility to perform ADL                  With   [] TE   [] TA   [] neuro   [] other: Patient Education: [x] Review HEP    [] Progressed/Changed HEP based on:   [] positioning   [] body mechanics   [] transfers   [] heat/ice application    [] other:      Other Objective/Functional Measures:       Pain Level (0-10 scale) post treatment: 6    ASSESSMENT/Changes in Function:      Patient will continue to benefit from skilled PT services to modify and progress therapeutic interventions, address functional mobility deficits, address ROM deficits, address strength deficits, analyze and address soft tissue restrictions, analyze and cue movement patterns, and assess and modify postural abnormalities to attain remaining goals. [x]  See Plan of Care  []  See progress note/recertification  []  See Discharge Summary         Progress towards goals / Updated goals:  Short Term Goals: To be accomplished in 1 weeks:   1. The pt will be I and complaint with HEP   IE- issued HEP  Long Term Goals: To be accomplished in 4 weeks:   1. Improve FOTO score to the predicted outcome for improved ability for ADl   IE- 62   2. The pt will demonstrate 90 degrees of right shoulder AROM scaption to improve ability for ADL   IE- 65 degrees   3. Improve right shoulder FIR to L5 for improved ability for ADLs   IE- PSIS   4.  The pt will report no difficulty with reaching a shoulder height shelf   IE- much difficulty    PLAN  []  Upgrade activities as tolerated     [x]  Continue plan of care  []  Update interventions per flow sheet       []  Discharge due to:_  []  Other:_      Woodrow Miles, PT 11/16/2022  11:32 AM    No future appointments.

## 2022-11-16 NOTE — PROGRESS NOTES
[facility]  [address] [facility] [address]  [place], [address]  [phone] fax    Plan of Care/ Statement of Necessity for Physical Therapy Services    Patient name: Akil Salazar Start of Care: 2022   Referral source: Tony English MD : 1950    Medical Diagnosis: Adhesive capsulitis of right shoulder [M75.01]  Bursitis of right shoulder [M75.51]  Payor: BLUE CROSS MEDICARE / Plan: [facility] [employer] [place]IC[place] PPO / Product Type: Managed Care Medicare /  Onset Date:Oct 2022    Treatment Diagnosis: right shoulder pain   Prior Hospitalization: see medical history Provider#: [id number]   Medications: Verified on Patient summary List    Comorbidities: OA, LBP, DM, HTN, lumbar surgery   Prior Level of Function: functionally I with all activities; right handed      The Plan of Care and following information is based on the information from the initial evaluation. Assessment/ key information: 66 y/o female presents with c/o right shoulder pain with no mechanism of injury. She reports waking up at the end of October and was unable to lift her arm. The pt reports she received an injection without relief. The pt demonstrates limited right shoulder AROM and PROM and limited right shoulder strength. + tenderness to palpation is noted over the anterior shoulder, supraspinatus and infraspinatus. The pt demonstrates limited capsular mobility with possible RTC pathology. The pt will benefit from PT to address the aforementioned impairments. Evaluation Complexity History MEDIUM  Complexity : 1-2 comorbidities / personal factors will impact the outcome/ POC ; Examination MEDIUM Complexity : 3 Standardized tests and measures addressing body structure, function, activity limitation and / or participation in recreation  ;Presentation MEDIUM Complexity : Evolving with changing characteristics  ; Clinical Decision Making MEDIUM Complexity : FOTO score of 26-74  Overall Complexity Rating: MEDIUM  Problem List: pain affecting function, decrease ROM, decrease strength, decrease ADL/ functional abilitiies, decrease activity tolerance, and decrease flexibility/ joint mobility   Treatment Plan may include any combination of the following: Therapeutic exercise, Neuromuscular reeducation, Manual therapy, Therapeutic activity, Self care/home management, Electric stim unattended , Vasopneumatic device, and Ultrasound and Aquatic Therapy  Patient / Family readiness to learn indicated by: asking questions and trying to perform skills  Persons(s) to be included in education: patient (P)  Barriers to Learning/Limitations: None  Patient Goal (s): For therapy to help  Patient Self Reported Health Status: fair  Rehabilitation Potential: good    Short Term Goals: To be accomplished in 1 weeks:   1. The pt will be I and complaint with HEP     Long Term Goals: To be accomplished in 4 weeks:   1. Improve FOTO score to the predicted outcome for improved ability for ADl   2. The pt will demonstrate 90 degrees of right shoulder AROM scaption to improve ability for ADL   3. Improve right shoulder FIR to L5 for improved ability for ADLs   4. The pt will report no difficulty with reaching a shoulder height shelf    Frequency / Duration: Patient to be seen 2 times per week for 4 weeks. Patient/ Caregiver education and instruction: Diagnosis, prognosis, self care, activity modification, and exercises   [x]  Plan of care has been reviewed with PTA    Certification Period: 11-16-22 to 12-14-22  Melina Mcmullen, PT 11/16/2022 11:34 AM    ________________________________________________________________________    I certify that the above Therapy Services are being furnished while the patient is under my care. I agree with the treatment plan and certify that this therapy is necessary.     Physician's Signature:____________________  Date:____________Time: _________     Luis Kenney MD  Please sign and return to [facility] - [facility]  [address]  [place], [address]  [phone] fax

## 2022-11-30 ENCOUNTER — HOSPITAL ENCOUNTER (OUTPATIENT)
Dept: PHYSICAL THERAPY | Age: 72
Discharge: HOME | End: 2022-11-30
Payer: MEDICARE

## 2022-11-30 PROCEDURE — 97113 AQUATIC THERAPY/EXERCISES: CPT

## 2022-11-30 NOTE — PROGRESS NOTES
PT DAILY TREATMENT NOTE     Patient Name: Tasha Becerra  IPXC:[id number]  : 1950  [x]  Patient  Verified  Payor: BLUE CROSS MEDICARE / Plan: [place] MEDIC[place] PPO / Product Type: Managed Care Medicare /    In time:1:56  Out time:2:23  Total Treatment Time (min): 27  Visit #: 2 of 8    Medicare/BCBS Only   Total Timed Codes (min):  27 1:1 Treatment Time:  27       Treatment Area: Adhesive capsulitis of right shoulder [M75.01]  Bursitis of right shoulder [M75.51]    SUBJECTIVE  Pain Level (0-10 scale): 4-5/10  Any medication changes, allergies to medications, adverse drug reactions, diagnosis change, or new procedure performed?: [x] No    [] Yes (see summary sheet for update)  Subjective functional status/changes:   [] No changes reported  Pt reports her shoulder is still bothering her. Pt reports compliance with HEP. OBJECTIVE    27 min Therapeutic Exercise:  [x] See flow sheet :   Rationale: increase ROM, increase strength, and improve coordination to improve the patients ability to perform ADLs with increased ease. With   [] TE   [] TA   [] neuro   [] other: Patient Education: [x] Review HEP    [] Progressed/Changed HEP based on:   [] positioning   [] body mechanics   [] transfers   [] heat/ice application    [] other:      Other Objective/Functional Measures: Initiated aquatic based PT as per flow sheet. Pain Level (0-10 scale) post treatment: 6/10    ASSESSMENT/Changes in Function: Pt has increased pain with end range shoulder abduction and flexion with significant limitations through all planes.       Patient will continue to benefit from skilled PT services to modify and progress therapeutic interventions, address functional mobility deficits, address ROM deficits, address strength deficits, analyze and address soft tissue restrictions, analyze and cue movement patterns, analyze and modify body mechanics/ergonomics, and assess and modify postural abnormalities to attain remaining goals. []  See Plan of Care  []  See progress note/recertification  []  See Discharge Summary         Progress towards goals / Updated goals:  Short Term Goals: To be accomplished in 1 weeks:              1. The pt will be I and complaint with HEP              IE- issued HEP   Current: pt reports compliance with HEP. 11/30/2022  Long Term Goals:  To be accomplished in 4 weeks:              1. Improve FOTO score to the predicted outcome for improved ability for ADl              IE- 62              2. The pt will demonstrate 90 degrees of right shoulder AROM scaption to improve ability for ADL              IE- 65 degrees              3. Improve right shoulder FIR to L5 for improved ability for ADLs              IE- PSIS              4. The pt will report no difficulty with reaching a shoulder height shelf              IE- much difficulty    PLAN  []  Upgrade activities as tolerated     [x]  Continue plan of care  []  Update interventions per flow sheet       []  Discharge due to:_  []  Other:_      Paulina Lopez PTA 11/30/2022  2:00 PM    Future Appointments   Date Time Provider Mary Meraz   12/2/2022  2:15 PM Cassclaudia Foot, PTA [facility]   12/6/2022  2:30 PM Jason Tidwell, PT [facility]   12/8/2022  1:45 PM Mabel Sofia, PT [facility]   12/13/2022 11:30 AM Salomerigaurav Foot, PTA [facility]   12/15/2022 11:30 AM Cassandria Foot, PTA [facility]   12/20/2022  1:00 PM Mabel Sofia, PT [facility]

## 2022-12-02 ENCOUNTER — HOSPITAL ENCOUNTER (OUTPATIENT)
Dept: PHYSICAL THERAPY | Age: 72
Discharge: HOME | End: 2022-12-02
Payer: MEDICARE

## 2022-12-02 PROCEDURE — 97113 AQUATIC THERAPY/EXERCISES: CPT

## 2022-12-02 NOTE — PROGRESS NOTES
PT DAILY TREATMENT NOTE     Patient Name: Akil Salazar  Date:2022  : 1950  [x]  Patient  Verified  Payor: BLUE CROSS MEDICARE / Plan: [place]IC[place] PPO / Product Type: Managed Care Medicare /    In time:2:15  Out time:2:55  Total Treatment Time (min): 40  Visit #: 3 of 8    Medicare/BCBS Only   Total Timed Codes (min):  40 1:1 Treatment Time:  40       Treatment Area: Adhesive capsulitis of right shoulder [M75.01]  Bursitis of right shoulder [M75.51]    SUBJECTIVE  Pain Level (0-10 scale): 5/10  Any medication changes, allergies to medications, adverse drug reactions, diagnosis change, or new procedure performed?: [x] No    [] Yes (see summary sheet for update)  Subjective functional status/changes:   [] No changes reported  Pt reports she felt okay after her last treatment. Pt states she goes to the [facility] and walks in the pool and she used her     OBJECTIVE    40 min Therapeutic Exercise:  [x] See flow sheet : Aquatic Therapy   Rationale: increase ROM, increase strength, improve coordination, and increase proprioception to improve the patients ability to improve tolerance to ADLs       With   [] TE   [] TA   [] neuro   [] other: Patient Education: [x] Review HEP    [] Progressed/Changed HEP based on:   [] positioning   [] body mechanics   [] transfers   [] heat/ice application    [] other:      Other Objective/Functional Measures: Increased reps as per flow sheet. Pain Level (0-10 scale) post treatment: /10    ASSESSMENT/Changes in Function: Pt continues to require max visual and verbal cues to perform all exercises properly; Pt has significant compensations when performing shoulder elevation greater than 60 degrees. Pt will transition to land based PT next visit to further progress right shoulder A/PROM.      Patient will continue to benefit from skilled PT services to modify and progress therapeutic interventions, address functional mobility deficits, address ROM deficits, address strength deficits, analyze and address soft tissue restrictions, analyze and cue movement patterns, analyze and modify body mechanics/ergonomics, and assess and modify postural abnormalities to attain remaining goals. []  See Plan of Care  []  See progress note/recertification  []  See Discharge Summary         Progress towards goals / Updated goals:  Short Term Goals: To be accomplished in 1 weeks:              1. The pt will be I and complaint with HEP              IE- issued HEP              Current: pt reports compliance with HEP. 11/30/2022  Long Term Goals:  To be accomplished in 4 weeks:              1. Improve FOTO score to the predicted outcome for improved ability for ADl              IE- 62              2. The pt will demonstrate 90 degrees of right shoulder AROM scaption to improve ability for ADL              IE- 65 degrees              3. Improve right shoulder FIR to L5 for improved ability for ADLs              IE- PSIS              4. The pt will report no difficulty with reaching a shoulder height shelf              IE- much difficulty    PLAN  []  Upgrade activities as tolerated     [x]  Continue plan of care  []  Update interventions per flow sheet       []  Discharge due to:_  []  Other:_      Kimberley Garcia PTA 12/2/2022  2:20 PM    Future Appointments   Date Time Provider Mary Meraz   12/6/2022  2:30 PM Nadeen Cheung PT [facility]   12/8/2022  1:45 PM Saumya Mcintosh PT [facility]   12/13/2022 11:30 AM Lindsay Cain PTA [facility]   12/15/2022 11:30 AM Lindsay Cain PTA [facility]   12/20/2022  1:00 PM Saumya Mcintosh PT [facility]

## 2022-12-06 ENCOUNTER — HOSPITAL ENCOUNTER (OUTPATIENT)
Dept: PHYSICAL THERAPY | Age: 72
Discharge: HOME | End: 2022-12-06
Payer: MEDICARE

## 2022-12-06 PROCEDURE — 97110 THERAPEUTIC EXERCISES: CPT

## 2022-12-06 PROCEDURE — 97140 MANUAL THERAPY 1/> REGIONS: CPT

## 2022-12-06 NOTE — PROGRESS NOTES
PT DAILY TREATMENT NOTE     Patient Name: Enmanuel Garcia  Date:2022  : 1950  [x]  Patient  Verified  Payor: BLUE CROSS MEDICARE / Plan: [place]IC[place] PPO / Product Type: Managed Care Medicare /    In time:2:30  Out time:3:19  Total Treatment Time (min): 49  Visit #: 4 of 8    Medicare/BCBS Only   Total Timed Codes (min):  39 1:1 Treatment Time:  39       Treatment Area: Adhesive capsulitis of right shoulder [M75.01]  Bursitis of right shoulder [M75.51]    SUBJECTIVE  Pain Level (0-10 scale): 5  Any medication changes, allergies to medications, adverse drug reactions, diagnosis change, or new procedure performed?: [x] No    [] Yes (see summary sheet for update)  Subjective functional status/changes:   [] No changes reported  The pt reports she is hanging in there.      OBJECTIVE    Modality rationale: decrease inflammation and decrease pain to improve the patients ability to perform ADL   Min Type Additional Details    [] Estim:  []Unatt       []IFC  []Premod                        []Other:  []w/ice   []w/heat  Position:  Location:    [] Estim: []Att    []TENS instruct  []NMES                    []Other:  []w/US   []w/ice   []w/heat  Position:  Location:    []  Traction: [] Cervical       []Lumbar                       [] Prone          []Supine                       []Intermittent   []Continuous Lbs:  [] before manual  [] after manual    []  Ultrasound: []Continuous   [] Pulsed                           []1MHz   []3MHz W/cm2:  Location:    []  Iontophoresis with dexamethasone         Location: [] Take home patch   [] In clinic   10 [x]  Ice     []  heat  []  Ice massage  []  Laser   []  Anodyne Position:seated  Location:right shoulder    []  Laser with stim  []  Other:  Position:  Location:    []  Vasopneumatic Device    []  Right     []  Left  Pre-treatment girth:  Post-treatment girth:  Measured at (location):  Pressure:       [] lo [] med [] hi   Temperature: [] lo [] med [] hi   [] Skin assessment post-treatment:  []intact []redness- no adverse reaction    [x]redness - adverse reaction:     31 min Therapeutic Exercise:  [x] See flow sheet :   Rationale: increase ROM and increase strength to improve the patients ability to perform ADL    8 min Manual Therapy:  Right shoulder GHJ mobs grade II, PROM of the right shoulder    The manual therapy interventions were performed at a separate and distinct time from the therapeutic activities interventions. Rationale: decrease pain, increase ROM, and increase tissue extensibility to perform ADL    With   [] TE   [] TA   [] neuro   [] other: Patient Education: [x] Review HEP    [] Progressed/Changed HEP based on:   [] positioning   [] body mechanics   [] transfers   [] heat/ice application    [] other:      Other Objective/Functional Measures: initiated land based PT     Pain Level (0-10 scale) post treatment: 3-4    ASSESSMENT/Changes in Function:  The pt tolerated land based exercises well but requires cues with most therex for technique. Patient will continue to benefit from skilled PT services to modify and progress therapeutic interventions, address functional mobility deficits, address ROM deficits, address strength deficits, analyze and address soft tissue restrictions, and analyze and cue movement patterns to attain remaining goals. []  See Plan of Care  []  See progress note/recertification  []  See Discharge Summary         Progress towards goals / Updated goals:  Short Term Goals: To be accomplished in 1 weeks:              1. The pt will be I and complaint with HEP              IE- issued HEP              Current: pt reports compliance with HEP. 11/30/2022  Long Term Goals:  To be accomplished in 4 weeks:              1. Improve FOTO score to the predicted outcome for improved ability for ADl              IE- 62              2. The pt will demonstrate 90 degrees of right shoulder AROM scaption to improve ability for ADL IE- 65 degrees   Current: 72 degrees on 12-6-22              3. Improve right shoulder FIR to L5 for improved ability for ADLs              IE- PSIS              4. The pt will report no difficulty with reaching a shoulder height shelf              IE- much difficulty    PLAN  []  Upgrade activities as tolerated     [x]  Continue plan of care  []  Update interventions per flow sheet       []  Discharge due to:_  []  Other:_      Deniz Sanchez, PT 12/6/2022  2:37 PM    Future Appointments   Date Time Provider Mary Meraz   12/8/2022  1:45 PM [facility]   12/13/2022 11:30 AM [facility]   12/15/2022 11:30 AM [facility]   12/20/2022  1:00 PM [facility]

## 2022-12-08 ENCOUNTER — HOSPITAL ENCOUNTER (OUTPATIENT)
Dept: PHYSICAL THERAPY | Age: 72
Discharge: HOME | End: 2022-12-08
Payer: MEDICARE

## 2022-12-08 PROCEDURE — 97110 THERAPEUTIC EXERCISES: CPT

## 2022-12-08 PROCEDURE — 97140 MANUAL THERAPY 1/> REGIONS: CPT

## 2022-12-08 NOTE — PROGRESS NOTES
PT DAILY TREATMENT NOTE     Patient Name: Tremayne Almaraz  YAIQ:  : 1950  [x]  Patient  Verified  Payor: BLUE CROSS MEDICARE / Plan: [place] MEDIC[place] PPO / Product Type: Managed Care Medicare /    In time:145  Out time:228pm  Total Treatment Time (min): 43  Visit #: 5 of 8    Medicare/BCBS Only   Total Timed Codes (min):  43 1:1 Treatment Time:  25       Treatment Area: Adhesive capsulitis of right shoulder [M75.01]  Bursitis of right shoulder [M75.51]    SUBJECTIVE  Pain Level (0-10 scale): 4  Any medication changes, allergies to medications, adverse drug reactions, diagnosis change, or new procedure performed?: [x] No    [] Yes (see summary sheet for update)  Subjective functional status/changes:   [] No changes reported  \"Will it get better? \"    OBJECTIVE    Modality rationale: decrease inflammation and decrease pain to improve the patients ability to manage self care. Min Type Additional Details   10 [x]  Ice     []  heat  []  Ice massage  []  Laser   []  Anodyne Position:seated  Location:left shoulder   [] Skin assessment post-treatment:  []intact []redness- no adverse reaction    []redness - adverse reaction:      17 1:1 min Therapeutic Exercise:  [] See flow sheet :   Rationale: increase ROM, increase strength, and improve coordination to improve the patients ability to manage ADLs. 8 min Manual Therapy:  posterior and inferior GH mobs GR II/III in loose pack and 80 deg; STM left UT; pt supine with leg wedge   The manual therapy interventions were performed at a separate and distinct time from the therapeutic activities interventions. Rationale: decrease pain, increase ROM, and increase tissue extensibility to improve ADL ease.           With   [] TE   [] TA   [] neuro   [] other: Patient Education: [x] Review HEP    [] Progressed/Changed HEP based on:   [] positioning   [] body mechanics   [] transfers   [] heat/ice application    [] other:      Other Objective/Functional Measures: exercises per flowsheet     Pain Level (0-10 scale) post treatment: 3    ASSESSMENT/Changes in Function: Pt performs all exercises as directed with pain with elevation. Able to achieve 90 deg scaption today with pain. Recommend continued shoulder mobility and stability. Patient will continue to benefit from skilled PT services to modify and progress therapeutic interventions, address functional mobility deficits, address ROM deficits, address strength deficits, analyze and address soft tissue restrictions, analyze and cue movement patterns, analyze and modify body mechanics/ergonomics, and assess and modify postural abnormalities to attain remaining goals. []  See Plan of Care  []  See progress note/recertification  []  See Discharge Summary         Progress towards goals / Updated goals:  Short Term Goals: To be accomplished in 1 weeks:              1. The pt will be I and complaint with HEP              IE- issued HEP              Current: pt reports compliance with HEP. 11/30/2022  Long Term Goals:  To be accomplished in 4 weeks:              1. Improve FOTO score to the predicted outcome for improved ability for ADl              IE- 62              2. The pt will demonstrate 90 degrees of right shoulder AROM scaption to improve ability for ADL              IE- 65 degrees   Current: met, 90 deg with pain (12/8/2022)              3. Improve right shoulder FIR to L5 for improved ability for ADLs              IE- PSIS              4. The pt will report no difficulty with reaching a shoulder height shelf              IE- much difficulty    PLAN  []  Upgrade activities as tolerated     [x]  Continue plan of care  []  Update interventions per flow sheet       []  Discharge due to:_  []  Other:_      Ada Vieyra, PT 12/8/2022  1:54 PM    Future Appointments   Date Time Provider Mary Meraz   12/13/2022 11:30 AM Zuleima García PTA [facility]   12/15/2022 11:30 AM Ramesh Erickson, PTA [facility]   12/20/2022  1:00 PM [facility] Drake[facility]

## 2022-12-13 ENCOUNTER — HOSPITAL ENCOUNTER (OUTPATIENT)
Dept: PHYSICAL THERAPY | Age: 72
Discharge: HOME | End: 2022-12-13
Payer: MEDICARE

## 2022-12-13 PROCEDURE — 97110 THERAPEUTIC EXERCISES: CPT

## 2022-12-13 PROCEDURE — 97140 MANUAL THERAPY 1/> REGIONS: CPT

## 2022-12-13 NOTE — PROGRESS NOTES
PT DAILY TREATMENT NOTE     Patient Name: Ophelia Cosby  PTWS:  : 1950  [x]  Patient  Verified  Payor: BLUE CROSS MEDICARE / Plan: [place] / Product Type: Managed Care Medicare /    In time:11:29 Out time:12:15  Total Treatment Time (min): 46  Visit #: 6 of 8    Medicare/BCBS Only   Total Timed Codes (min):  36 1:1 Treatment Time:  30       Treatment Area: Adhesive capsulitis of right shoulder [M75.01]  Bursitis of right shoulder [M75.51]    SUBJECTIVE  Pain Level (0-10 scale): 3/10  Any medication changes, allergies to medications, adverse drug reactions, diagnosis change, or new procedure performed?: [x] No    [] Yes (see summary sheet for update)  Subjective functional status/changes:   [] No changes reported  Pt reports no change in symptoms.       OBJECTIVE    Modality rationale: decrease pain and increase tissue extensibility to improve the patients ability to perform    Min Type Additional Details    [] Estim:  []Unatt       []IFC  []Premod                        []Other:  []w/ice   []w/heat  Position:  Location:    [] Estim: []Att    []TENS instruct  []NMES                    []Other:  []w/US   []w/ice   []w/heat  Position:  Location:    []  Traction: [] Cervical       []Lumbar                       [] Prone          []Supine                       []Intermittent   []Continuous Lbs:  [] before manual  [] after manual    []  Ultrasound: []Continuous   [] Pulsed                           []1MHz   []3MHz W/cm2:  Location:    []  Iontophoresis with dexamethasone         Location: [] Take home patch   [] In clinic   10 []  Ice     []  heat  []  Ice massage  []  Laser   []  Anodyne Position:sitting  Location:right shoulder    []  Laser with stim  []  Other:  Position:  Location:    []  Vasopneumatic Device    []  Right     []  Left  Pre-treatment girth:  Post-treatment girth:  Measured at (location):  Pressure:       [] lo [] med [] hi   Temperature: [] lo [] med [] hi [] Skin assessment post-treatment:  []intact []redness- no adverse reaction    []redness - adverse reaction:     28 min Therapeutic Exercise:  [x] See flow sheet :   Rationale: increase ROM and increase strength to improve the patients ability to perform ADLs with increased ease. 8 min Manual Therapy:  PROM to right GHJ; gentle GHJ mobs inferior/posterior. The manual therapy interventions were performed at a separate and distinct time from the therapeutic activities interventions. Rationale: decrease pain, increase ROM, and increase tissue extensibility to improve functional mobility. With   [] TE   [] TA   [] neuro   [] other: Patient Education: [x] Review HEP    [] Progressed/Changed HEP based on:   [] positioning   [] body mechanics   [] transfers   [] heat/ice application    [] other:      Other Objective/Functional Measures:      Pain Level (0-10 scale) post treatment: 6/10    ASSESSMENT/Changes in Function: Pt has increased discomfort with A/PROM;  Pt has difficulty reducing muscle guarding with PROM requiring frequent vc's to reduce guarding. Patient will continue to benefit from skilled PT services to modify and progress therapeutic interventions, address functional mobility deficits, address ROM deficits, address strength deficits, analyze and address soft tissue restrictions, analyze and cue movement patterns, analyze and modify body mechanics/ergonomics, and assess and modify postural abnormalities to attain remaining goals. []  See Plan of Care  []  See progress note/recertification  []  See Discharge Summary         Progress towards goals / Updated goals:  Short Term Goals: To be accomplished in 1 weeks:              1. The pt will be I and complaint with HEP              IE- issued HEP              Current: pt reports compliance with HEP. 11/30/2022  Long Term Goals:  To be accomplished in 4 weeks:              1. Improve FOTO score to the predicted outcome for improved ability for ADl              IE- 62              2. The pt will demonstrate 90 degrees of right shoulder AROM scaption to improve ability for ADL              IE- 65 degrees              Current: met, 90 deg with pain (12/8/2022)              3. Improve right shoulder FIR to L5 for improved ability for ADLs              IE- PSIS              4. The pt will report no difficulty with reaching a shoulder height shelf              IE- much difficulty    PLAN  []  Upgrade activities as tolerated     [x]  Continue plan of care  []  Update interventions per flow sheet       []  Discharge due to:_  []  Other:_      Astrid Badillo PTA 12/13/2022  12:02 PM    Future Appointments   Date Time Provider Mary Meraz   12/15/2022 11:30 AM Sandra Lilly PTA [facility]   12/20/2022  1:00 PM [facility]

## 2022-12-15 ENCOUNTER — HOSPITAL ENCOUNTER (OUTPATIENT)
Dept: PHYSICAL THERAPY | Age: 72
Discharge: HOME | End: 2022-12-15
Payer: MEDICARE

## 2022-12-15 PROCEDURE — 97112 NEUROMUSCULAR REEDUCATION: CPT

## 2022-12-15 PROCEDURE — 97110 THERAPEUTIC EXERCISES: CPT

## 2022-12-15 NOTE — PROGRESS NOTES
PT DAILY TREATMENT NOTE     Patient Name: Ophelia Cosby  KVNT:  : 1950  [x]  Patient  Verified  Payor: BLUE CROSS MEDICARE / Plan: [place] [place] / Product Type: Managed Care Medicare /    In time:11:30  Out time:12:10  Total Treatment Time (min): 40  Visit #: 1 of 8    Medicare/BCBS Only   Total Timed Codes (min):  30 1:1 Treatment Time:  30       Treatment Area: Adhesive capsulitis of right shoulder [M75.01]  Bursitis of right shoulder [M75.51]    SUBJECTIVE  Pain Level (0-10 scale): 4-5/10  Any medication changes, allergies to medications, adverse drug reactions, diagnosis change, or new procedure performed?: [x] No    [] Yes (see summary sheet for update)  Subjective functional status/changes:   [] No changes reported  Pt reports her pain is worse at night and first thing in the morning but it has subsided some. OBJECTIVE    Modality rationale: decrease inflammation and decrease pain to improve the patients ability to perform ADLs with increased ease.     Min Type Additional Details    [] Estim:  []Unatt       []IFC  []Premod                        []Other:  []w/ice   []w/heat  Position:  Location:    [] Estim: []Att    []TENS instruct  []NMES                    []Other:  []w/US   []w/ice   []w/heat  Position:  Location:    []  Traction: [] Cervical       []Lumbar                       [] Prone          []Supine                       []Intermittent   []Continuous Lbs:  [] before manual  [] after manual    []  Ultrasound: []Continuous   [] Pulsed                           []1MHz   []3MHz W/cm2:  Location:    []  Iontophoresis with dexamethasone         Location: [] Take home patch   [] In clinic   10 [x]  Ice     []  heat  []  Ice massage  []  Laser   []  Anodyne Position:sitting  Location:right shoulder    []  Laser with stim  []  Other:  Position:  Location:    []  Vasopneumatic Device    []  Right     []  Left  Pre-treatment girth:  Post-treatment girth:  Measured at (location):  Pressure:       [] lo [] med [] hi   Temperature: [] lo [] med [] hi   [] Skin assessment post-treatment:  []intact []redness- no adverse reaction    []redness - adverse reaction:     20 min Therapeutic Exercise:  [x] See flow sheet :   Rationale: increase ROM and increase strength to improve the patients ability to perform ADLs with increased ease. 10 min Neuromuscular Re-education:  [x]  See flow sheet :   Rationale: increase strength, improve coordination, and increase proprioception  to improve the patients ability to perform ADLs with increased ease. With   [] TE   [] TA   [] neuro   [] other: Patient Education: [x] Review HEP    [] Progressed/Changed HEP based on:   [] positioning   [] body mechanics   [] transfers   [] heat/ice application    [] other:      Other Objective/Functional Measures: Added doorway stretch and scaption wall slide. Pain Level (0-10 scale) post treatment: 6/10    ASSESSMENT/Changes in Function: Pt continues to have pain with shoulder elevation greater than 90 degrees. Patient will continue to benefit from skilled PT services to modify and progress therapeutic interventions, address functional mobility deficits, address ROM deficits, address strength deficits, analyze and address soft tissue restrictions, analyze and cue movement patterns, analyze and modify body mechanics/ergonomics, and assess and modify postural abnormalities to attain remaining goals. []  See Plan of Care  []  See progress note/recertification  []  See Discharge Summary         Progress towards goals / Updated goals:  Goals for this certification period to be accomplished in 4 weeks:      1. Improve FOTO score to the predicted outcome for improved ability for ADl              Recert:progressed to 50.               2. The pt will demonstrate 120 degrees of right shoulder AROM scaption to improve ability for ADL              Recert: progressed to 90 deg with pain. 3. Improve right shoulder FIR to L5 for improved ability for ADLs              Recert:remains PSIS. 4. The pt will report no difficulty with reaching a shoulder height shelf             Recert: Progressing: Pt reports a little bit of difficulty.       PLAN  []  Upgrade activities as tolerated     [x]  Continue plan of care  []  Update interventions per flow sheet       []  Discharge due to:_  []  Other:_      Gretchen Teixeira PTA 12/15/2022  11:37 AM    Future Appointments   Date Time Provider Mary Meraz   12/20/2022  1:00 PM Maria Guadalupe Jarvis, PT [id number]

## 2022-12-15 NOTE — PROGRESS NOTES
[facility]  [address] [facility] [address]  [place], [address]  [phone] fax    Continued Plan of Care/ Re-certification for Physical Therapy Services    Patient name: Isai Louis Start of Care: 2022   Referral source: Julienne Meléndez MD : 1950               Medical Diagnosis: Adhesive capsulitis of right shoulder [M75.01]  Bursitis of right shoulder [M75.51]  Payor: BLUE CROSS MEDICARE / Plan: [place] [place] MEDIC[place] PPO / Product Type: Managed Care Medicare /  Onset Date:Oct 2022               Treatment Diagnosis: right shoulder pain   Prior Hospitalization: see medical history Provider#: [id number]   Medications: Verified on Patient summary List    Comorbidities: OA, LBP, DM, HTN, lumbar surgery   Prior Level of Function: functionally I with all activities; right handed                            Visits from Start of Care: 7    Missed Visits: 0    The Plan of Care and following information is based on the patient's current status:  Short Term Goals: To be accomplished in 1 weeks:              1. The pt will be I and complaint with HEP              IE- issued HEP              Current: pt reports compliance with HEP. Long Term Goals: To be accomplished in 4 weeks:              1. Improve FOTO score to the predicted outcome for improved ability for ADl              IE- 62   Current: progressed to 50.               2. The pt will demonstrate 90 degrees of right shoulder AROM scaption to improve ability for ADL              IE- 65 degrees              Current: met, 90 deg with pain               3. Improve right shoulder FIR to L5 for improved ability for ADLs              IE- PSIS              Current:remains PSIS. 4. The pt will report no difficulty with reaching a shoulder height shelf              IE- much difficulty   Current: Progressing: Pt reports a little bit of difficulty.      Key functional changes: Increased AROM of left shoulder     Problems/ barriers to goal attainment: none     Problem List: pain affecting function, decrease ROM, decrease strength, decrease ADL/ functional abilitiies, decrease activity tolerance, decrease flexibility/ joint mobility, and decrease transfer abilities    Treatment Plan: Therapeutic exercise, Neuromuscular reeducation, Manual therapy, Therapeutic activity, Self care/home management, and Aquatic therapy     Patient Goal (s) has been updated and includes: \"To be able to reach where I want to reach without pain. \"     Goals for this certification period to be accomplished in 4 weeks:      1. Improve FOTO score to the predicted outcome for improved ability for ADl              Recert:progressed to 50.               2. The pt will demonstrate 120 degrees of right shoulder AROM scaption to improve ability for ADL              Recert: progressed to 90 deg with pain. 3. Improve right shoulder FIR to L5 for improved ability for ADLs              Recert:remains PSIS. 4. The pt will report no difficulty with reaching a shoulder height shelf             Recert: Progressing: Pt reports a little bit of difficulty. Frequency / Duration: Patient to be seen 2 times per week for 4 weeks:    Assessment / Recommendations:Pt has made some progress toward goals with slight increase in available AROM of right shoulder but continues to have pain when reaching greater than 90 degrees shoulder flexion. Pt also is unable to retrieve items from her shelves over head due to pain and weakness in right shoulder.      Patient will continue to benefit from skilled PT services to modify and progress therapeutic interventions, address functional mobility deficits, address ROM deficits, address strength deficits, analyze and address soft tissue restrictions, analyze and cue movement patterns, analyze and modify body mechanics/ergonomics, and assess and modify postural abnormalities to attain remaining goals. Certification Period: 12/15/2022-0/13/2023    Adonay Navas PTA 12/15/2022 11:45 AM    ________________________________________________________________________  I certify that the above Therapy Services are being furnished while the patient is under my care. I agree with the treatment plan and certify that this therapy is necessary. [] I have read the above and request that my patient continue as recommended.   [] I have read the above report and request that my patient continue therapy with the following changes/special instructions: _____________________________________________  [] I have read the above report and request that my patient be discharged from therapy    Physician's Signature:____________Date:_________TIME:________     Eddie Neal MD  ** Signature, Date and Time must be completed for valid certification **    Please sign and return to [facility] [address] [place] [address] & [address]  [address]  [place], [facility] [address]  [phone] fax

## 2022-12-20 ENCOUNTER — HOSPITAL ENCOUNTER (OUTPATIENT)
Dept: PHYSICAL THERAPY | Age: 72
Discharge: HOME | End: 2022-12-20
Payer: MEDICARE

## 2022-12-20 PROCEDURE — 97110 THERAPEUTIC EXERCISES: CPT

## 2022-12-20 PROCEDURE — 97140 MANUAL THERAPY 1/> REGIONS: CPT

## 2022-12-20 NOTE — PROGRESS NOTES
PT DAILY TREATMENT NOTE     Patient Name: Dane Hansen  ZDLY:  : 1950  [x]  Patient  Verified  Payor: BLUE CROSS MEDICARE / Plan: [place]UE [place] MEDIC[place] PPO / Product Type: Managed Care Medicare /    In time:103pm  Out time:148pm  Total Treatment Time (min): 45  Visit #: 1 of 8    Medicare/BCBS Only   Total Timed Codes (min):  35 1:1 Treatment Time:  30       Treatment Area: Adhesive capsulitis of right shoulder [M75.01]  Bursitis of right shoulder [M75.51]    SUBJECTIVE  Pain Level (0-10 scale): 6  Any medication changes, allergies to medications, adverse drug reactions, diagnosis change, or new procedure performed?: [x] No    [] Yes (see summary sheet for update)  Subjective functional status/changes:   [] No changes reported  Attributes pain to using the arm and cold weather with arthritis. OBJECTIVE    Modality rationale: decrease pain to improve the patients ability to manage self care. Min Type Additional Details   10 []  Ice     [x]  heat  []  Ice massage  []  Laser   []  Anodyne Position:seated  Location:right shoulder   [] Skin assessment post-treatment:  []intact []redness- no adverse reaction    []redness - adverse reaction:     22 min Therapeutic Exercise:  [x] See flow sheet :   Rationale: increase ROM and increase strength to improve the patients ability to manage ADLs with reduced pain. 8 min Manual Therapy:  pt supine with wedge -- STM right UT, GR II posterior GH joint oscillations, PROM with gentle oscillatory overpressure into flexion, scaption, ER, and IR   The manual therapy interventions were performed at a separate and distinct time from the therapeutic activities interventions. Rationale: decrease pain, increase ROM, and increase tissue extensibility to improve ADL ease.           With   [] TE   [] TA   [] neuro   [] other: Patient Education: [x] Review HEP    [] Progressed/Changed HEP based on:   [] positioning   [] body mechanics   [] transfers   [] heat/ice application    [] other:      Other Objective/Functional Measures: exercises per flowsheet; added supine tband scapular stabilization     Pain Level (0-10 scale) post treatment: 3    ASSESSMENT/Changes in Function: Performed trial of heat today versus ice with good effect. Pt able to achieve PROM > 90 deg into flexion and scaption today, but ER/IR remains quite painful. She follows up with Dr Onur Perez on 1/5/2022. Recommend continued stability and mobility work. Patient will continue to benefit from skilled PT services to modify and progress therapeutic interventions, address functional mobility deficits, address ROM deficits, address strength deficits, analyze and address soft tissue restrictions, analyze and cue movement patterns, analyze and modify body mechanics/ergonomics, and assess and modify postural abnormalities to attain remaining goals. []  See Plan of Care  []  See progress note/recertification  []  See Discharge Summary         Progress towards goals / Updated goals:  1. Improve FOTO score to the predicted outcome for improved ability for ADl  Recert: progressed to 50.   2. The pt will demonstrate 120 degrees of right shoulder AROM scaption to improve ability for ADL  Recert: progressed to 90 deg with pain. 3. Improve right shoulder FIR to L5 for improved ability for ADLs  Recert: remains PSIS. Current: PSIS (12/20/2022)  4. The pt will report no difficulty with reaching a shoulder height shelf  Recert: Progressing: Pt reports a little bit of difficulty.      PLAN  []  Upgrade activities as tolerated     [x]  Continue plan of care  []  Update interventions per flow sheet       []  Discharge due to:_  []  Other:_      Myra Peck PT 12/20/2022  1:05 PM    Future Appointments   Date Time Provider Mary Meraz   12/30/2022  2:00 PM Ewa Snyder [facility]   1/3/2023 12:00 PM [facility]

## 2022-12-30 ENCOUNTER — HOSPITAL ENCOUNTER (OUTPATIENT)
Dept: PHYSICAL THERAPY | Age: 72
End: 2022-12-30
Payer: MEDICARE

## 2022-12-30 PROCEDURE — 97140 MANUAL THERAPY 1/> REGIONS: CPT

## 2022-12-30 PROCEDURE — 97110 THERAPEUTIC EXERCISES: CPT

## 2022-12-30 NOTE — PROGRESS NOTES
PT DAILY TREATMENT NOTE     Patient Name: Mairo Helm  QSVV:  : 1950  [x]  Patient  Verified  Payor: BLUE CROSS MEDICARE / Plan: [place]IC[place] PPO / Product Type: Managed Care Medicare /    In time:200  Out time:248  Total Treatment Time (min): 48  Visit #: 2 of 8    Medicare/BCBS Only   Total Timed Codes (min):  38 1:1 Treatment Time:  38       Treatment Area: Adhesive capsulitis of right shoulder [M75.01]  Bursitis of right shoulder [M75.51]    SUBJECTIVE  Pain Level (0-10 scale): 4  Any medication changes, allergies to medications, adverse drug reactions, diagnosis change, or new procedure performed?: [x] No    [] Yes (see summary sheet for update)  Subjective functional status/changes:   [] No changes reported  \"My shoulder is still bothering  me. I go back to my doctor on the 5th. \" She states that overhead reaching is when her shoulder bothers her. OBJECTIVE    Modality rationale: decrease pain and increase tissue extensibility to improve the patients ability to tolerate ADLs.     Min Type Additional Details    [] Estim:  []Unatt       []IFC  []Premod                        []Other:  []w/ice   []w/heat  Position:  Location:    [] Estim: []Att    []TENS instruct  []NMES                    []Other:  []w/US   []w/ice   []w/heat  Position:  Location:    []  Traction: [] Cervical       []Lumbar                       [] Prone          []Supine                       []Intermittent   []Continuous Lbs:  [] before manual  [] after manual    []  Ultrasound: []Continuous   [] Pulsed                           []1MHz   []3MHz W/cm2:  Location:    []  Iontophoresis with dexamethasone         Location: [] Take home patch   [] In clinic   10 []  Ice     [x]  heat  []  Ice massage  []  Laser   []  Anodyne Position: seated  Location: right shoulder    []  Laser with stim  []  Other:  Position:  Location:    []  Vasopneumatic Device    []  Right     []  Left  Pre-treatment girth:  Post-treatment girth:  Measured at (location):  Pressure:       [] lo [] med [] hi   Temperature: [] lo [] med [] hi   [x] Skin assessment post-treatment:  [x]intact [x]redness- no adverse reaction    []redness - adverse reaction:         30 min Therapeutic Exercise:  [x] See flow sheet :   Rationale: increase ROM and increase strength to improve the patients ability to complete AdLs with ease. 8 min Manual Therapy:  Left S/L: scapular clocks in all directions, STM right UT  -supine with HOB elevated and wedge under LEs: Grade 2-3 posterior GH joint mobs, PROM into flexion and abduction with gentle LAD, PROM into ER/IR   The manual therapy interventions were performed at a separate and distinct time from the therapeutic activities interventions. Rationale: decrease pain, increase ROM, increase tissue extensibility, and decrease trigger points to improve functional mobility. With   [] TE   [] TA   [] neuro   [] other: Patient Education: [x] Review HEP    [] Progressed/Changed HEP based on:   [] positioning   [] body mechanics   [] transfers   [] heat/ice application    [] other:      Other Objective/Functional Measures:   -increased repetitions during supine Tband exercises   -added time under tension during wall slides     Pain Level (0-10 scale) post treatment: 5-6    ASSESSMENT/Changes in Function: Patient with increased challenge exhibited with overhead reaching. Increased mm guarding exhibited during manual today. Patient reported no adverse responses throughout activities. Patient requiring moderate VC/Tc for form correction.      Patient will continue to benefit from skilled PT services to modify and progress therapeutic interventions, address functional mobility deficits, address ROM deficits, address strength deficits, analyze and address soft tissue restrictions, analyze and cue movement patterns, analyze and modify body mechanics/ergonomics, assess and modify postural abnormalities, and instruct in home and community integration to attain remaining goals. []  See Plan of Care  []  See progress note/recertification  []  See Discharge Summary         Progress towards goals / Updated goals:  1. Improve FOTO score to the predicted outcome for improved ability for ADl  Recert: progressed to 50.   2. The pt will demonstrate 120 degrees of right shoulder AROM scaption to improve ability for ADL  Recert: progressed to 90 deg with pain. 3. Improve right shoulder FIR to L5 for improved ability for ADLs  Recert: remains PSIS. Current: PSIS (12/20/2022)  4. The pt will report no difficulty with reaching a shoulder height shelf  Recert: Progressing: Pt reports a little bit of difficulty.      PLAN  []  Upgrade activities as tolerated     []  Continue plan of care  []  Update interventions per flow sheet       []  Discharge due to:_  []  Other:_      Balbir Shane PTA 12/30/2022  6:56 AM    Future Appointments   Date Time Provider Mary Meraz   12/30/2022  2:00 PM Ariel Hernández [facility]   1/3/2023 12:00 PM Harmeet Montez PTA [facility]

## 2023-01-04 ENCOUNTER — HOSPITAL ENCOUNTER (OUTPATIENT)
Dept: PHYSICAL THERAPY | Age: 73
Discharge: HOME | End: 2023-01-04
Payer: MEDICARE

## 2023-01-04 PROCEDURE — 97110 THERAPEUTIC EXERCISES: CPT

## 2023-01-04 PROCEDURE — 97112 NEUROMUSCULAR REEDUCATION: CPT

## 2023-01-04 NOTE — PROGRESS NOTES
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 OBJECTIVE    Modality rationale: decrease pain and increase tissue extensibility to improve the patients ability to perform ADLs with increased ease.     Min Type Additional Details    [] Estim:  []Unatt       []IFC  []Premod                        []Other:  []w/ice   []w/heat  Position:  Location:    [] Estim: []Att    []TENS instruct  []NMES                    []Other:  []w/US   []w/ice   []w/heat  Position:  Location:    []  Traction: [] Cervical       []Lumbar                       [] Prone          []Supine                       []Intermittent   []Continuous Lbs:  [] before manual  [] after manual    []  Ultrasound: []Continuous   [] Pulsed                           []1MHz   []3MHz W/cm2:  Location:    []  Iontophoresis with dexamethasone         Location: [] Take home patch   [] In clinic   10 []  Ice     [x]  heat  []  Ice massage  []  Laser   []  Anodyne Position:sitting  Location:right shoulder    []  Laser with stim  []  Other:  Position:  Location:    []  Vasopneumatic Device    []  Right     []  Left  Pre-treatment girth:  Post-treatment girth:  Measured at (location):  Pressure:       [] lo [] med [] hi   Temperature: [] lo [] med [] hi   [] Skin assessment post-treatment:  []intact []redness- no adverse reaction    []redness - adverse reaction:     21 min Therapeutic Exercise:  [x] See flow sheet :   Rationale: increase ROM and increase strength to improve the patients ability to perform ADLs with increased ease. 10 min Neuromuscular Re-education:  [x]  See flow sheet :   Rationale: increase strength, improve coordination, and increase proprioception  to improve the patients ability to perform ADLs with increased ease. With   [] TE   [] TA   [] neuro   [] other: Patient Education: [x] Review HEP    [] Progressed/Changed HEP based on:   [] positioning   [] body mechanics   [] transfers   [] heat/ice application    [] other:      Other Objective/Functional Measures: AROM shoulder flexion remains 90 degrees with pain. Pain Level (0-10 scale) post treatment: 5/10    ASSESSMENT/Changes in Function: Pt demonstrates improved AAROM right shoulder flexion to 130 degrees but continues to have pain and limited shoulder elevation to approx 90 degrees when performing AG AROM. Patient will continue to benefit from skilled PT services to modify and progress therapeutic interventions, address functional mobility deficits, address ROM deficits, address strength deficits, analyze and address soft tissue restrictions, analyze and cue movement patterns, analyze and modify body mechanics/ergonomics, and assess and modify postural abnormalities to attain remaining goals. []  See Plan of Care  []  See progress note/recertification  []  See Discharge Summary         Progress towards goals / Updated goals:  1. Improve FOTO score to the predicted outcome for improved ability for ADl  Recert: progressed to 50.   2. The pt will demonstrate 120 degrees of right shoulder AROM scaption to improve ability for ADL  Recert: progressed to 90 deg with pain.    3. Improve right shoulder FIR to L5 for improved ability for ADLs  Recert: remains PSIS. Current: PSIS (12/20/2022)  4. The pt will report no difficulty with reaching a shoulder height shelf  Recert: Progressing: Pt reports a little bit of difficulty. Current: remains difficult. 1/4/2023    PLAN  []  Upgrade activities as tolerated     [x]  Continue plan of care  []  Update interventions per flow sheet       []  Discharge due to:_  []  Other:_      Sachin Henderson PTA 1/4/2023  12:42 PM    No future appointments.

## 2023-01-18 ENCOUNTER — TRANSCRIBE ORDER (OUTPATIENT)
Dept: SCHEDULING | Age: 73
End: 2023-01-18

## 2023-01-18 DIAGNOSIS — M75.01 ADHESIVE BURSITIS OF RIGHT SHOULDER: Primary | ICD-10-CM

## 2023-01-31 ENCOUNTER — TRANSCRIBE ORDERS (OUTPATIENT)
Facility: HOSPITAL | Age: 73
End: 2023-01-31

## 2023-01-31 DIAGNOSIS — M75.01 ADHESIVE BURSITIS OF RIGHT SHOULDER: Primary | ICD-10-CM

## 2023-02-01 DIAGNOSIS — M75.01 ADHESIVE BURSITIS OF RIGHT SHOULDER: Primary | ICD-10-CM

## 2023-02-05 DIAGNOSIS — M75.01 ADHESIVE BURSITIS OF RIGHT SHOULDER: Primary | ICD-10-CM

## 2023-03-02 ENCOUNTER — HOSPITAL ENCOUNTER (OUTPATIENT)
Facility: HOSPITAL | Age: 73
Discharge: HOME | End: 2023-03-02
Payer: MEDICARE

## 2023-03-02 DIAGNOSIS — M75.01 ADHESIVE BURSITIS OF RIGHT SHOULDER: ICD-10-CM

## 2023-03-02 PROCEDURE — 73221 MRI JOINT UPR EXTREM W/O DYE: CPT

## 2024-08-08 ENCOUNTER — TRANSCRIBE ORDERS (OUTPATIENT)
Facility: HOSPITAL | Age: 74
End: 2024-08-08

## 2024-08-08 DIAGNOSIS — Z12.31 SCREENING MAMMOGRAM FOR HIGH-RISK PATIENT: Primary | ICD-10-CM

## 2024-10-08 ENCOUNTER — HOSPITAL ENCOUNTER (OUTPATIENT)
Facility: HOSPITAL | Age: 74
Discharge: HOME | End: 2024-10-11
Attending: FAMILY MEDICINE
Payer: MEDICARE

## 2024-10-08 VITALS — HEIGHT: 61 IN | BODY MASS INDEX: 31.72 KG/M2 | WEIGHT: 168 LBS

## 2024-10-08 DIAGNOSIS — Z12.31 ENCOUNTER FOR SCREENING MAMMOGRAM FOR HIGH-RISK PATIENT: ICD-10-CM

## 2024-10-08 PROCEDURE — 77063 BREAST TOMOSYNTHESIS BI: CPT
